# Patient Record
Sex: FEMALE | Race: BLACK OR AFRICAN AMERICAN | NOT HISPANIC OR LATINO
[De-identification: names, ages, dates, MRNs, and addresses within clinical notes are randomized per-mention and may not be internally consistent; named-entity substitution may affect disease eponyms.]

---

## 2020-11-02 ENCOUNTER — APPOINTMENT (OUTPATIENT)
Dept: CARDIOLOGY | Facility: CLINIC | Age: 70
End: 2020-11-02

## 2021-02-13 ENCOUNTER — NON-APPOINTMENT (OUTPATIENT)
Age: 71
End: 2021-02-13

## 2021-02-16 ENCOUNTER — NON-APPOINTMENT (OUTPATIENT)
Age: 71
End: 2021-02-16

## 2021-02-16 ENCOUNTER — APPOINTMENT (OUTPATIENT)
Dept: HEART AND VASCULAR | Facility: CLINIC | Age: 71
End: 2021-02-16
Payer: MEDICARE

## 2021-02-16 VITALS
SYSTOLIC BLOOD PRESSURE: 126 MMHG | HEART RATE: 50 BPM | RESPIRATION RATE: 14 BRPM | HEIGHT: 66 IN | WEIGHT: 230 LBS | DIASTOLIC BLOOD PRESSURE: 71 MMHG | OXYGEN SATURATION: 96 % | BODY MASS INDEX: 36.96 KG/M2 | TEMPERATURE: 98.2 F

## 2021-02-16 PROCEDURE — 99203 OFFICE O/P NEW LOW 30 MIN: CPT

## 2021-02-16 PROCEDURE — 99072 ADDL SUPL MATRL&STAF TM PHE: CPT

## 2021-02-16 NOTE — HISTORY OF PRESENT ILLNESS
[FreeTextEntry1] : PRIMARY CARDIOLOGIST: Dr. Calderon\par \par 71 yo F with PMH HTN, HLD, afib,  asthma, obesity, found to have newly diagnosed CMP with EF 35% here for first evaluation and to obtain a second opinion prior to afib ablation. \par As per records  the patient was admitted to [facility] in the setting of afib with RVR in 9/2020 and  underwent successful  DCCV. Found to have new cardiomyopathy with EF 35% was started on a/c. \par As per patient's report she had a coronary CT as outpatient ( no records) \par The patient reports compliance with her medications, denies any symptoms other than occasional sensation of skipped beats which self resolve. \par Reports excellent exercise tolerance. \par \par PMH\par as above\par \par ALL\par NKDA\par \par MEDS\par xarelto 20 mg daily\par metoprolol 200 mg daily  \par amiodarone 200 mg bid\par losartan 50 mg daily \par HCTZ 25 mg daily \par simvastatin 20 mg daily \par symbycort \par \par SH\par no smoke, no etoh\par \par LABS 9/2020 normal TSH, normal CMP\par \par EKG 2.16.2021\par Sinus bradycardia HR 46

## 2021-02-16 NOTE — ASSESSMENT
[FreeTextEntry1] : 71 yo F with PMH HTN, HLD, afib,  asthma, obesity, found to have newly diagnosed CMP with EF 35% here for first evaluation and to obtain a second opinion prior to afib ablation. \par \par AFIB\par -the patient today is in sinus bradycardia and she denies any symptoms\par -explained that more information from her primary cardiologist would be helpful ( recent echo, recent holter, results of CT coronaries)\par -would also assess symptoms off amiodarone (explained that side effects of long term therapy) \par -would also reduce metoprolol in the setting of bradycardia\par -recc to continue with current meds including a/c at this time, will obtain records from primary cardiologist and review with the patient in 2 weeks\par \par \par HFrEF\par -appears euvolemic today \par -as above\par -obtain records\par -cont with betablocker and ARB\par \par f/u in 2 weeks for record review

## 2021-02-16 NOTE — PHYSICAL EXAM
[Normal Oral Mucosa] : normal oral mucosa [No Oral Pallor] : no oral pallor [No Oral Cyanosis] : no oral cyanosis [Normal Jugular Venous A Waves Present] : normal jugular venous A waves present [Normal Jugular Venous V Waves Present] : normal jugular venous V waves present [No Jugular Venous Zapata A Waves] : no jugular venous zapata A waves [Heart Rate And Rhythm] : heart rate and rhythm were normal [Heart Sounds] : normal S1 and S2 [Murmurs] : no murmurs present [Respiration, Rhythm And Depth] : normal respiratory rhythm and effort [Auscultation Breath Sounds / Voice Sounds] : lungs were clear to auscultation bilaterally [Exaggerated Use Of Accessory Muscles For Inspiration] : no accessory muscle use [Abdomen Soft] : soft [Abdomen Tenderness] : non-tender [Abdomen Mass (___ Cm)] : no abdominal mass palpated [Nail Clubbing] : no clubbing of the fingernails [Cyanosis, Localized] : no localized cyanosis [Petechial Hemorrhages (___cm)] : no petechial hemorrhages [] : no ischemic changes [FreeTextEntry1] : obese, nad

## 2021-03-02 ENCOUNTER — APPOINTMENT (OUTPATIENT)
Dept: HEART AND VASCULAR | Facility: CLINIC | Age: 71
End: 2021-03-02
Payer: MEDICARE

## 2021-03-02 ENCOUNTER — NON-APPOINTMENT (OUTPATIENT)
Age: 71
End: 2021-03-02

## 2021-03-02 VITALS
RESPIRATION RATE: 12 BRPM | WEIGHT: 232 LBS | TEMPERATURE: 97 F | HEIGHT: 66 IN | BODY MASS INDEX: 37.28 KG/M2 | HEART RATE: 55 BPM | SYSTOLIC BLOOD PRESSURE: 137 MMHG | OXYGEN SATURATION: 95 % | DIASTOLIC BLOOD PRESSURE: 73 MMHG

## 2021-03-02 PROCEDURE — 99072 ADDL SUPL MATRL&STAF TM PHE: CPT

## 2021-03-02 PROCEDURE — 99215 OFFICE O/P EST HI 40 MIN: CPT

## 2021-03-02 NOTE — HISTORY OF PRESENT ILLNESS
[FreeTextEntry1] : PRIMARY CARDIOLOGIST: Dr. Calderon\par \par 69 yo F with PMH HTN, HLD, afib,  asthma, obesity, found to have newly diagnosed CMP with EF 35% here for follow up and and to obtain a second opinion prior to afib ablation.\par the patient today did not bring any additional documentation, other than a limited CT chest (results copied below).\par No echo is available, no notes available for review. \par The patient reports feeling fine, reports only occasional palpitations which last only few seconds and self resolve. Reports excellent exercise tolerance. \par Denies syncope, presyncope, orthopnea. \par \par As per previous note on 2/16/2021:  \par As per records  the patient was admitted to [facility] in the setting of afib with RVR in 9/2020 and  underwent successful  DCCV. Found to have new cardiomyopathy with EF 35% was started on a/c. \par As per patient's report she had a coronary CT as outpatient ( no records) \par The patient reports compliance with her medications, denies any symptoms other than occasional sensation of skipped beats which self resolve. \par Reports excellent exercise tolerance. \par \par PMH\par as above\par \par ALL\par NKDA\par \par MEDS\par xarelto 20 mg daily\par metoprolol 200 mg daily  \par amiodarone 200 mg bid\par losartan 50 mg daily \par HCTZ 25 mg daily \par simvastatin 20 mg daily \par symbycort \par \par SH\par no smoke, no etoh\par \par LABS 9/2020 normal TSH, normal CMP\par \par EKG 2.16.2021\par Sinus bradycardia HR 46 \par \par EKG 03/02/2021 SB HR 42\par \par CT heart 2/16/2021\par limited coronary artery assessment on this non dedicated CT sutdy. extensive coronary artery calcified atherosclerotic plaque. no definite significant stenosis involving the LAD,although assessment is limited. \par \par LAbs 1/19/21\par creatininie 0.8

## 2021-03-02 NOTE — PHYSICAL EXAM
[Normal Oral Mucosa] : normal oral mucosa [No Oral Pallor] : no oral pallor [No Oral Cyanosis] : no oral cyanosis [Normal Jugular Venous A Waves Present] : normal jugular venous A waves present [Normal Jugular Venous V Waves Present] : normal jugular venous V waves present [No Jugular Venous Zapata A Waves] : no jugular venous zapata A waves [Heart Rate And Rhythm] : heart rate and rhythm were normal [Heart Sounds] : normal S1 and S2 [Murmurs] : no murmurs present [Respiration, Rhythm And Depth] : normal respiratory rhythm and effort [Exaggerated Use Of Accessory Muscles For Inspiration] : no accessory muscle use [Auscultation Breath Sounds / Voice Sounds] : lungs were clear to auscultation bilaterally [Abdomen Soft] : soft [Abdomen Tenderness] : non-tender [Abdomen Mass (___ Cm)] : no abdominal mass palpated [Nail Clubbing] : no clubbing of the fingernails [Cyanosis, Localized] : no localized cyanosis [Petechial Hemorrhages (___cm)] : no petechial hemorrhages [] : no ischemic changes [FreeTextEntry1] : obese, nad

## 2021-03-02 NOTE — ASSESSMENT
[FreeTextEntry1] : 71 yo F with PMH HTN, HLD, afib,  asthma, obesity, found to have newly diagnosed CMP with EF 35% here  to obtain a second opinion prior to afib ablation. \par \par AFIB\par -the patient today is in sinus bradycardia and she denies any symptoms\par -She was in SB also on last visit\par -explained that more information ( recent echo, recent holter, results of CT coronaries) is necessary\par -the patient reports that last echo and holter were done in 9/2020 \par -in the setting of marked bradycardia, recc to decrease metoprolol from 200 mg daily to 100 mg daily \par -will obtain echo and holter to assess EF after > 5 months on medical therapy and holter to assess afib burden\par -explained that long term therapy with amiodarone is not recommended and after holter will consider d/cing amiodarone \par -the patient was explained that these tests are necessary in order to provide her with a second opininon in regards to the need for an ablation. Also explained that referral to EP specialist is reccomended\par \par \par HFrEF\par -appears euvolemic today \par -etiology of cardiomyoapthy is unclear (tachycardia induced? vs othr? )\par -obtain records, would recc dedicated CT coronaries to r/o CAD\par -obtain echo and holter \par -cont with betablocker and ARB\par \par f/u in 3 weeks for echo and holter results and to assess symptoms on lower BB dose

## 2021-03-09 ENCOUNTER — NON-APPOINTMENT (OUTPATIENT)
Age: 71
End: 2021-03-09

## 2021-03-13 ENCOUNTER — NON-APPOINTMENT (OUTPATIENT)
Age: 71
End: 2021-03-13

## 2021-03-16 ENCOUNTER — APPOINTMENT (OUTPATIENT)
Dept: HEART AND VASCULAR | Facility: CLINIC | Age: 71
End: 2021-03-16
Payer: MEDICARE

## 2021-03-16 PROCEDURE — 99072 ADDL SUPL MATRL&STAF TM PHE: CPT

## 2021-03-16 PROCEDURE — 93306 TTE W/DOPPLER COMPLETE: CPT

## 2021-04-08 ENCOUNTER — APPOINTMENT (OUTPATIENT)
Dept: HEART AND VASCULAR | Facility: CLINIC | Age: 71
End: 2021-04-08
Payer: MEDICARE

## 2021-04-08 VITALS
TEMPERATURE: 97.6 F | SYSTOLIC BLOOD PRESSURE: 111 MMHG | RESPIRATION RATE: 15 BRPM | OXYGEN SATURATION: 96 % | BODY MASS INDEX: 41.99 KG/M2 | HEART RATE: 52 BPM | HEIGHT: 63 IN | WEIGHT: 237 LBS | DIASTOLIC BLOOD PRESSURE: 69 MMHG

## 2021-04-08 PROCEDURE — 99215 OFFICE O/P EST HI 40 MIN: CPT

## 2021-04-08 PROCEDURE — 99072 ADDL SUPL MATRL&STAF TM PHE: CPT

## 2021-04-08 NOTE — ASSESSMENT
[FreeTextEntry1] : 69 yo F with PMH HTN, HLD, afib,  asthma, obesity, HFrEF now recovered here for follow up  \par \par AFIB\par -the patient today is in sinus bradycardia and she denies any symptoms\par -Holter monitor without evidence of atrial fibrillation, only sinus bradycardia\par -Echocardiogram with recovered EF\par -in the setting of marked bradycardia, recc to decrease metoprolol from 200 mg daily to 100 mg daily \par -will also stop amiodarone recommended to the patient to call the clinic immediately for new onset of palpitations\par -CMP, TSH today\par \par HFrEF\par NYHA class I\par -EF recovered to normal  echocardiogram 3/16/21\par -appears euvolemic today \par -etiology of cardiomyopathy is likely tachycardia induced but  would recc dedicated CT coronaries to r/o CAD in the setting of extensive calcification \par -obtain records from [facility] to assess which tests were done to assess coronary anatomy \par -cont with beta-blocker and ARB\par \par HLD\par -fasting lipid panel\par \par \par \par f/u in 3 weeks for symptoms assessment off amiodarone and on lower BB dose and f/u labs /review documentation

## 2021-04-08 NOTE — HISTORY OF PRESENT ILLNESS
[FreeTextEntry1] : PRIMARY CARDIOLOGIST: Dr. Calderon\par \par 71 yo F with PMH HTN, HLD, afib,  asthma, obesity, found to have newly diagnosed CMP with EF 35% in 9/2020 here for followup and obtain echo and holter results\par The patient reports feeling fine. Reports excellent exercise tolerance. \par Denies syncope, presyncope, orthopnea. \par The patient reports that did not decrease her metoprolol dose from 200 mg daily to 100 mg daily  as previously prescribed\par \par PMH\par as above\par \par ALL\par NKDA\par \par MEDS\par xarelto 20 mg daily\par metoprolol 200 mg daily --> switched to 100 mg daily  \par amiodarone 200 mg bid--> stopped on 4.8.2021\par losartan 50 mg daily \par HCTZ 25 mg daily \par simvastatin 20 mg daily \par symbycort \par \par SH\par no smoke, no etoh\par \par LABS 9/2020 normal TSH, normal CMP\par \par EKG 2.16.2021\par Sinus bradycardia HR 46 \par \par EKG 03/02/2021 SB HR 42\par \par CT heart 2/16/2021\par limited coronary artery assessment on this non dedicated CT sutdy. extensive coronary artery calcified atherosclerotic plaque. no definite significant stenosis involving the LAD,although assessment is limited. \par \par LAbs 1/19/21\par creatininie 0.8\par \par Echocardiogram 3/22/2021\par -Left ventricular ejection fraction is 60-65%\par -Mild TR\par -PAPS= 43 mmHg\par \par Holter monitor 3/30/20/2021\par Sinus bradycardia heart rates ranging 40-47 bpm\par No atrial fibrillation\par \par

## 2021-05-13 ENCOUNTER — RX RENEWAL (OUTPATIENT)
Age: 71
End: 2021-05-13

## 2021-05-13 ENCOUNTER — APPOINTMENT (OUTPATIENT)
Dept: HEART AND VASCULAR | Facility: CLINIC | Age: 71
End: 2021-05-13
Payer: MEDICARE

## 2021-05-13 VITALS
RESPIRATION RATE: 16 BRPM | HEART RATE: 49 BPM | WEIGHT: 242 LBS | OXYGEN SATURATION: 98 % | TEMPERATURE: 96.4 F | BODY MASS INDEX: 42.88 KG/M2 | DIASTOLIC BLOOD PRESSURE: 77 MMHG | HEIGHT: 63 IN | SYSTOLIC BLOOD PRESSURE: 150 MMHG

## 2021-05-13 VITALS — DIASTOLIC BLOOD PRESSURE: 80 MMHG | SYSTOLIC BLOOD PRESSURE: 140 MMHG | HEART RATE: 52 BPM

## 2021-05-13 PROCEDURE — 99072 ADDL SUPL MATRL&STAF TM PHE: CPT

## 2021-05-13 PROCEDURE — 99215 OFFICE O/P EST HI 40 MIN: CPT

## 2021-05-13 NOTE — ASSESSMENT
[FreeTextEntry1] : 70 yo F with PMH HTN, HLD, afib,  asthma, obesity, found to have newly diagnosed CMP with EF 35% in 9/2020 with now recovered EF here for followup \par \par AFIB\par -the patient today is in sinus bradycardia and she denies any symptoms\par -Holter monitor without evidence of atrial fibrillation, only sinus bradycardia\par -Echocardiogram with recovered EF\par -cont  metoprolol 100 mg daily (was reduced on last visit, recc to reports any symptoms), now off amiodarone\par -CMP, TSH 4/8/2021 wnl \par \par CHEST PAIN\par -atypical, but in the setting of risk factors, h/o CMP, extensive coronary calcification on non dedecated CT heart, will define coronary anatomy with dedicated CT angio to r/o CAD\par \par HFrEF\par NYHA class I\par -EF recovered to normal  echocardiogram 3/16/21\par -appears euvolemic today \par -etiology of cardiomyopathy is likely tachycardia induced\par -cont with beta-blocker and ARB for at least one year\par \par HLD\par -fasting lipid panel 4/8/2021 trig 67, chol 191, hdl 70, ldl 107\par -cont with atorvastatin recc healthy diet and exercise\par \par HTN\par -reports good BP control at home, BP borderline binu on second BP check today\par -recc to cont with BB and ARB at current doses, and monitor BP at home\par -if BP still borderline at next visit, will uptitrate losartan\par \par f/u in 3 weeks for symptoms assessment and CTA coronaries results

## 2021-05-13 NOTE — HISTORY OF PRESENT ILLNESS
[FreeTextEntry1] : 70 yo F with PMH HTN, HLD, afib,  asthma, obesity, found to have newly diagnosed CMP with EF 35% in 9/2020 with now recovered EF here for followup \par The patient reports feeling fine. Reports good exercise tolerance but reports episodes of chest discomfort both at rest and during exercise, lasting few seconds, self resolving. Denies palpitations after d/cing amiodarone\par Denies syncope, presyncope, orthopnea. \par \par \par \par PMH\par as above\par \par ALL\par NKDA\par \par MEDS\par xarelto 20 mg daily\par metoprolol 100 mg daily  \par losartan 50 mg daily \par HCTZ 25 mg daily \par simvastatin 20 mg daily \par symbycort \par \par SH\par no smoke, no etoh\par \par LABS 9/2020 normal TSH, normal CMP\par LAbs 1/19/21\par creatininie 0.8\par \par 4/82021\par creat 0.75\par \par EKG 2.16.2021\par Sinus bradycardia HR 46 \par \par EKG 03/02/2021 SB HR 42\par \par CT heart 2/16/2021\par limited coronary artery assessment on this non dedicated CT sutdy. extensive coronary artery calcified atherosclerotic plaque. no definite significant stenosis involving the LAD,although assessment is limited. \par \par Echocardiogram 3/22/2021\par -Left ventricular ejection fraction is 60-65%\par -Mild TR\par -PAPS= 43 mmHg\par \par Holter monitor 3/30/20/2021\par Sinus bradycardia heart rates ranging 40-47 bpm\par No atrial fibrillation\par \par

## 2021-05-13 NOTE — PHYSICAL EXAM
[Normal Oral Mucosa] : normal oral mucosa [No Oral Pallor] : no oral pallor [No Oral Cyanosis] : no oral cyanosis [Normal Jugular Venous A Waves Present] : normal jugular venous A waves present [Normal Jugular Venous V Waves Present] : normal jugular venous V waves present [No Jugular Venous Zapata A Waves] : no jugular venous zapata A waves [Heart Rate And Rhythm] : heart rate and rhythm were normal [Heart Sounds] : normal S1 and S2 [Murmurs] : no murmurs present [Respiration, Rhythm And Depth] : normal respiratory rhythm and effort [Exaggerated Use Of Accessory Muscles For Inspiration] : no accessory muscle use [Auscultation Breath Sounds / Voice Sounds] : lungs were clear to auscultation bilaterally [Abdomen Soft] : soft [Abdomen Tenderness] : non-tender [Abdomen Mass (___ Cm)] : no abdominal mass palpated [Nail Clubbing] : no clubbing of the fingernails [Petechial Hemorrhages (___cm)] : no petechial hemorrhages [Cyanosis, Localized] : no localized cyanosis [] : no ischemic changes [FreeTextEntry1] : obese, nad

## 2021-06-14 ENCOUNTER — APPOINTMENT (OUTPATIENT)
Dept: CT IMAGING | Facility: HOSPITAL | Age: 71
End: 2021-06-14

## 2021-06-21 ENCOUNTER — APPOINTMENT (OUTPATIENT)
Dept: CT IMAGING | Facility: CLINIC | Age: 71
End: 2021-06-21
Payer: MEDICARE

## 2021-06-21 ENCOUNTER — OUTPATIENT (OUTPATIENT)
Dept: OUTPATIENT SERVICES | Facility: HOSPITAL | Age: 71
LOS: 1 days | End: 2021-06-21
Payer: COMMERCIAL

## 2021-06-21 DIAGNOSIS — R06.00 DYSPNEA, UNSPECIFIED: ICD-10-CM

## 2021-06-21 DIAGNOSIS — R07.89 OTHER CHEST PAIN: ICD-10-CM

## 2021-06-21 PROCEDURE — 82565 ASSAY OF CREATININE: CPT

## 2021-06-21 PROCEDURE — 75574 CT ANGIO HRT W/3D IMAGE: CPT

## 2021-06-21 PROCEDURE — 75574 CT ANGIO HRT W/3D IMAGE: CPT | Mod: 26

## 2021-07-08 ENCOUNTER — APPOINTMENT (OUTPATIENT)
Dept: HEART AND VASCULAR | Facility: CLINIC | Age: 71
End: 2021-07-08
Payer: MEDICARE

## 2021-07-08 VITALS
WEIGHT: 246 LBS | TEMPERATURE: 97.9 F | OXYGEN SATURATION: 95 % | DIASTOLIC BLOOD PRESSURE: 71 MMHG | HEIGHT: 63 IN | SYSTOLIC BLOOD PRESSURE: 128 MMHG | HEART RATE: 62 BPM | BODY MASS INDEX: 43.59 KG/M2 | RESPIRATION RATE: 15 BRPM

## 2021-07-08 PROCEDURE — 99215 OFFICE O/P EST HI 40 MIN: CPT

## 2021-07-08 NOTE — HISTORY OF PRESENT ILLNESS
[FreeTextEntry1] : 70 yo F with PMH HTN, HLD, afib,  asthma, obesity, found to have newly diagnosed CMP with EF 35% in 9/2020 with now recovered EF here for followup obtain coronary CTA results\par The patient reports feeling fine. Reports good exercise tolerance. Denies any  palpitations after d/cing amiodarone\par Denies syncope, presyncope, orthopnea. Reports resolution of her chest pain since last visit \par \par \par \par PMH\par as above\par \par ALL\par NKDA\par \par MEDS\par xarelto 20 mg daily\par metoprolol 100 mg daily  \par losartan 50 mg daily \par HCTZ 25 mg daily \par simvastatin 20 mg daily \par symbycort \par \par SH\par no smoke, no etoh\par \par LABS 9/2020 normal TSH, normal CMP\par LAbs 1/19/21\par creatininie 0.8\par \par 4/82021\par creat 0.75\par \par EKG 2.16.2021\par Sinus bradycardia HR 46 \par \par EKG 03/02/2021 SB HR 42\par \par CT heart 2/16/2021\par limited coronary artery assessment on this non dedicated CT sutdy. extensive coronary artery calcified atherosclerotic plaque. no definite significant stenosis involving the LAD,although assessment is limited. \par \par Echocardiogram 3/22/2021\par -Left ventricular ejection fraction is 60-65%\par -Mild TR\par -PAPS= 43 mmHg\par \par Holter monitor 3/30/20/2021\par Sinus bradycardia heart rates ranging 40-47 bpm\par No atrial fibrillation\par \par CTA coronaries 6/21/2021\par Nonobstructive epicardial coronary artery disease (LAD 30-49%, LCx 30%, RCA 30%)\par ,\par \par

## 2021-07-08 NOTE — ASSESSMENT
[FreeTextEntry1] : 70 yo F with PMH HTN, HLD, afib,  asthma, obesity, found to have newly diagnosed CMP with EF 35% in 9/2020 with now recovered EF here for followup \par \par AFIB\par -Recent Holter monitor without evidence of atrial fibrillation, only sinus bradycardia\par -Echocardiogram with recovered EF\par -cont  metoprolol 100 mg daily, now off amiodarone\par -CMP, TSH 4/8/2021 wnl \par -Commended to call the clinic immediately if new onset of palpitations\par -Continue with anticoagulation\par \par CHEST PAIN\par -Resolved\par -Nonobstructive coronary artery disease on CTA\par -recommend to continue medical therapy with aspirin, statin and BB\par \par HFrEF\par NYHA class I\par -EF recovered to normal  echocardiogram 3/16/21\par -appears euvolemic today \par -etiology of cardiomyopathy is likely tachycardia induced\par -cont with beta-blocker and ARB for at least one year\par \par HLD\par -fasting lipid panel 4/8/2021 trig 67, chol 191, hdl 70, ldl 107\par -cont with atorvastatin recc healthy diet and exercise\par \par HTN\par -Well-controlled today\par -recc to cont with BB and ARB at current doses, and monitor BP at home\par \par \par f/u in 4 months or sooner if any symptoms

## 2021-07-08 NOTE — PHYSICAL EXAM
[FreeTextEntry1] : obese, nad  [Normal Oral Mucosa] : normal oral mucosa [No Oral Pallor] : no oral pallor [No Oral Cyanosis] : no oral cyanosis [Normal Jugular Venous A Waves Present] : normal jugular venous A waves present [Normal Jugular Venous V Waves Present] : normal jugular venous V waves present [No Jugular Venous Zapata A Waves] : no jugular venous zapata A waves [Heart Rate And Rhythm] : heart rate and rhythm were normal [Heart Sounds] : normal S1 and S2 [Murmurs] : no murmurs present [Respiration, Rhythm And Depth] : normal respiratory rhythm and effort [Exaggerated Use Of Accessory Muscles For Inspiration] : no accessory muscle use [Auscultation Breath Sounds / Voice Sounds] : lungs were clear to auscultation bilaterally [Abdomen Soft] : soft [Abdomen Tenderness] : non-tender [Abdomen Mass (___ Cm)] : no abdominal mass palpated [Nail Clubbing] : no clubbing of the fingernails [Cyanosis, Localized] : no localized cyanosis [Petechial Hemorrhages (___cm)] : no petechial hemorrhages [] : no ischemic changes

## 2021-11-04 ENCOUNTER — NON-APPOINTMENT (OUTPATIENT)
Age: 71
End: 2021-11-04

## 2021-11-04 ENCOUNTER — APPOINTMENT (OUTPATIENT)
Dept: HEART AND VASCULAR | Facility: CLINIC | Age: 71
End: 2021-11-04
Payer: MEDICARE

## 2021-11-04 VITALS
OXYGEN SATURATION: 98 % | HEART RATE: 76 BPM | RESPIRATION RATE: 15 BRPM | SYSTOLIC BLOOD PRESSURE: 110 MMHG | HEIGHT: 63 IN | TEMPERATURE: 98.3 F | WEIGHT: 239 LBS | DIASTOLIC BLOOD PRESSURE: 64 MMHG | BODY MASS INDEX: 42.35 KG/M2

## 2021-11-04 PROCEDURE — 99214 OFFICE O/P EST MOD 30 MIN: CPT

## 2021-11-04 NOTE — HISTORY OF PRESENT ILLNESS
[FreeTextEntry1] : 70 yo F with PMH HTN, HLD, afib,  asthma, obesity, found to have newly diagnosed CMP with EF 35% in 9/2020 with now recovered EF here for followup \par The patient reports feeling fine. Reports good exercise tolerance\par The patient denies any chest pain, shortness of breath, palpitations, syncope, presyncope.\par She reports compliance with her medication without any side effects\par \par \par PMH\par as above\par \par ALL\par NKDA\par \par MEDS\par xarelto 20 mg daily\par metoprolol 100 mg daily  \par losartan 50 mg daily \par HCTZ 25 mg daily \par simvastatin 20 mg daily \par symbycort \par \par SH\par no smoke, no etoh\par \par LABS 9/2020 normal TSH, normal CMP\par LAbs 1/19/21\par creatininie 0.8\par \par 4/82021\par creat 0.75\par \par EKG 2.16.2021\par Sinus bradycardia HR 46 \par \par EKG 03/02/2021 SB HR 42\par EKG 11/4/21: A. fib,HR 72\par \par CT heart 2/16/2021\par limited coronary artery assessment on this non dedicated CT sutdy. extensive coronary artery calcified atherosclerotic plaque. no definite significant stenosis involving the LAD,although assessment is limited. \par \par Echocardiogram 3/22/2021\par -Left ventricular ejection fraction is 60-65%\par -Mild TR\par -PAPS= 43 mmHg\par \par Holter monitor 3/30/20/2021\par Sinus bradycardia heart rates ranging 40-47 bpm\par No atrial fibrillation\par \par CTA coronaries 6/21/2021\par Nonobstructive epicardial coronary artery disease (LAD 30-49%, LCx 30%, RCA 30%)\par ,\par \par

## 2021-11-04 NOTE — ASSESSMENT
[FreeTextEntry1] : 70 yo F with PMH HTN, HLD, afib,  asthma, obesity, found to have newly diagnosed CMP with EF 35% in 9/2020 with now recovered EF here for followup \par \par AFIB\par -Rate controlled today\par -Echocardiogram with recovered EF on 3/16/2021\par -cont  metoprolol 100 mg daily, now off amiodarone\par -CMP, TSH 4/8/2021 wnl \par -Continue with anticoagulation\par \par CHEST PAIN\par -Resolved\par -Nonobstructive coronary artery disease on CTA on 6/21/2021\par -recommend to continue medical therapy with aspirin, statin and BB\par -Recommend aggressive LDL control\par \par HFrEF\par NYHA class I\par -EF recovered to normal  echocardiogram 3/16/21\par -appears euvolemic today \par -etiology of cardiomyopathy is likely tachycardia induced\par -cont with beta-blocker and ARB for at least one year\par \par HLD\par -fasting lipid panel 4/8/2021 trig 67, chol 191, hdl 70, ldl 107\par -cont with simvastatin recc healthy diet and exercise.  LDL goal less than 100, consider switching to atorvastatin 40 mg daily\par \par HTN\par -Well-controlled today\par -recc to cont with BB  ARB and HCTZ at current doses, and monitor BP at home\par \par \par f/u in 4 months or sooner if any symptoms

## 2022-01-06 ENCOUNTER — APPOINTMENT (OUTPATIENT)
Dept: HEART AND VASCULAR | Facility: CLINIC | Age: 72
End: 2022-01-06
Payer: MEDICARE

## 2022-01-06 ENCOUNTER — NON-APPOINTMENT (OUTPATIENT)
Age: 72
End: 2022-01-06

## 2022-01-06 VITALS
OXYGEN SATURATION: 96 % | RESPIRATION RATE: 12 BRPM | HEART RATE: 104 BPM | HEIGHT: 63 IN | DIASTOLIC BLOOD PRESSURE: 68 MMHG | SYSTOLIC BLOOD PRESSURE: 112 MMHG | TEMPERATURE: 96.9 F | BODY MASS INDEX: 41.82 KG/M2 | WEIGHT: 236 LBS

## 2022-01-06 PROCEDURE — 99214 OFFICE O/P EST MOD 30 MIN: CPT

## 2022-01-06 NOTE — ASSESSMENT
[FreeTextEntry1] : 72 yo F with PMH HTN, HLD, afib,  asthma, obesity, found to have newly diagnosed CMP with EF 35% in 9/2020 with now recovered EF here for followup \par \par AFIB\par -Rate controlled today\par -Echocardiogram with recovered EF on 3/16/2021\par -cont  metoprolol 100 mg daily, now off amiodarone\par -obtain recent CMP from [facility]\par -Continue with anticoagulation in setting of high CHADsVAsc scroe\par \par CHEST PAIN\par -Resolved\par -Nonobstructive coronary artery disease on CTA on 6/21/2021\par -recommend to continue medical therapy with aspirin, statin and BB\par -Recommend aggressive LDL control\par \par HFrEF\par NYHA class I\par -EF recovered to normal  echocardiogram 3/16/21\par -appears euvolemic today \par -etiology of cardiomyopathy is likely tachycardia induced\par -cont with beta-blocker and ARB for at least one year\par \par HLD\par -fasting lipid panel 4/8/2021  (will obtain recent fasting lipids from [facility])\par -If LDL less than 100,will  be switching to atorvastatin 40 mg daily\par \par HTN\par -Well-controlled today\par -recc to cont with BB  ARB and HCTZ at current doses, and monitor BP at home\par \par \par f/u in 4 months or sooner if any symptoms

## 2022-01-06 NOTE — HISTORY OF PRESENT ILLNESS
[FreeTextEntry1] : 72 yo F with PMH HTN, HLD, afib,  asthma, obesity, (HFrEF (EF 35% in 9/2020 with now recovered EF)  here for followup \par The patient reports feeling fine. Reports good ET, can walk 10 blocks without stopping\par The patient denies any CP/shortness of breath/palpitations/ syncope/ presyncope/LE edema\par She reports compliance with her medication without any side effects\par Reports she had cholesterol checked at [facility] (Dr Andujar) last month\par \par PMH\par as above\par \par ALL\par NKDA\par \par MEDS\par xarelto 20 mg daily\par metoprolol 100 mg daily  \par losartan 50 mg daily \par HCTZ 25 mg daily \par simvastatin 20 mg daily \par symbycort \par \par SH\par no smoke, no etoh\par \par LABS 9/2020 normal TSH, normal CMP\par LAbs 1/19/21\par creatininie 0.8\par \par 4/82021\par creat 0.75\par \par EKG 2.16.2021\par Sinus bradycardia HR 46 \par \par EKG 03/02/2021 SB HR 42\par EKG 11/4/21: A. fib,HR 72\par EKG 1/6/2022: Afib HR 75\par \par \par CT heart 2/16/2021\par limited coronary artery assessment on this non dedicated CT study. extensive coronary artery calcified atherosclerotic plaque. no definite significant stenosis involving the LAD,although assessment is limited. \par \par Echocardiogram 3/22/2021\par -Left ventricular ejection fraction is 60-65%\par -Mild TR\par -PAPS= 43 mmHg\par \par Holter monitor 3/30/20/2021\par Sinus bradycardia heart rates ranging 40-47 bpm\par No atrial fibrillation\par \par CTA coronaries 6/21/2021\par Nonobstructive epicardial coronary artery disease (LAD 30-49%, LCx 30%, RCA 30%)\par ,\par \par

## 2022-01-06 NOTE — PHYSICAL EXAM
[Normal Oral Mucosa] : normal oral mucosa [No Oral Pallor] : no oral pallor [No Oral Cyanosis] : no oral cyanosis [Normal Jugular Venous A Waves Present] : normal jugular venous A waves present [Normal Jugular Venous V Waves Present] : normal jugular venous V waves present [No Jugular Venous Zapata A Waves] : no jugular venous zapata A waves [Heart Rate And Rhythm] : heart rate and rhythm were normal [Heart Sounds] : normal S1 and S2 [Murmurs] : no murmurs present [Respiration, Rhythm And Depth] : normal respiratory rhythm and effort [Exaggerated Use Of Accessory Muscles For Inspiration] : no accessory muscle use [Auscultation Breath Sounds / Voice Sounds] : lungs were clear to auscultation bilaterally [Abdomen Soft] : soft [Abdomen Tenderness] : non-tender [Abdomen Mass (___ Cm)] : no abdominal mass palpated [Nail Clubbing] : no clubbing of the fingernails [Cyanosis, Localized] : no localized cyanosis [Petechial Hemorrhages (___cm)] : no petechial hemorrhages [] : no ischemic changes [Well Developed] : well developed [Well Nourished] : well nourished [No Acute Distress] : no acute distress [Normal Conjunctiva] : normal conjunctiva [Normal Venous Pressure] : normal venous pressure [No Carotid Bruit] : no carotid bruit [Normal S1, S2] : normal S1, S2 [No Murmur] : no murmur [No Rub] : no rub [No Gallop] : no gallop [Clear Lung Fields] : clear lung fields [Good Air Entry] : good air entry [No Respiratory Distress] : no respiratory distress  [Soft] : abdomen soft [Non Tender] : non-tender [No Masses/organomegaly] : no masses/organomegaly [Normal Bowel Sounds] : normal bowel sounds [Normal Gait] : normal gait [No Edema] : no edema [No Cyanosis] : no cyanosis [No Clubbing] : no clubbing [No Varicosities] : no varicosities [No Rash] : no rash [No Skin Lesions] : no skin lesions [Moves all extremities] : moves all extremities [No Focal Deficits] : no focal deficits [Normal Speech] : normal speech [Alert and Oriented] : alert and oriented [Normal memory] : normal memory [FreeTextEntry1] : obese, nad

## 2022-04-08 LAB
ALBUMIN SERPL ELPH-MCNC: 4.3 G/DL
ALP BLD-CCNC: 62 U/L
ALT SERPL-CCNC: 20 U/L
ANION GAP SERPL CALC-SCNC: 12 MMOL/L
AST SERPL-CCNC: 19 U/L
BILIRUB SERPL-MCNC: 0.3 MG/DL
BUN SERPL-MCNC: 19 MG/DL
CALCIUM SERPL-MCNC: 9.7 MG/DL
CHLORIDE SERPL-SCNC: 103 MMOL/L
CHOLEST SERPL-MCNC: 191 MG/DL
CO2 SERPL-SCNC: 28 MMOL/L
CREAT SERPL-MCNC: 0.75 MG/DL
GLUCOSE SERPL-MCNC: 92 MG/DL
HDLC SERPL-MCNC: 70 MG/DL
LDLC SERPL CALC-MCNC: 107 MG/DL
NONHDLC SERPL-MCNC: 121 MG/DL
POTASSIUM SERPL-SCNC: 4 MMOL/L
PROT SERPL-MCNC: 6.2 G/DL
SODIUM SERPL-SCNC: 143 MMOL/L
TRIGL SERPL-MCNC: 67 MG/DL
TSH SERPL-ACNC: 1.11 UIU/ML

## 2022-05-10 ENCOUNTER — APPOINTMENT (OUTPATIENT)
Dept: HEART AND VASCULAR | Facility: CLINIC | Age: 72
End: 2022-05-10

## 2022-05-16 ENCOUNTER — APPOINTMENT (OUTPATIENT)
Dept: HEART AND VASCULAR | Facility: CLINIC | Age: 72
End: 2022-05-16
Payer: MEDICARE

## 2022-05-16 ENCOUNTER — NON-APPOINTMENT (OUTPATIENT)
Age: 72
End: 2022-05-16

## 2022-05-16 VITALS
BODY MASS INDEX: 41.82 KG/M2 | SYSTOLIC BLOOD PRESSURE: 112 MMHG | DIASTOLIC BLOOD PRESSURE: 66 MMHG | HEIGHT: 63 IN | RESPIRATION RATE: 14 BRPM | OXYGEN SATURATION: 99 % | WEIGHT: 236 LBS | HEART RATE: 99 BPM

## 2022-05-16 PROCEDURE — 99214 OFFICE O/P EST MOD 30 MIN: CPT

## 2022-05-16 NOTE — ASSESSMENT
[FreeTextEntry1] : 73 yo F with PMH HTN, HLD, afib,  asthma, obesity, found to have newly diagnosed CMP with EF 35% in 9/2020 with now recovered EF here for followup \par \par Dizziness\par -Only occurred at rest. Last episode last month, not associated with syncope, presyncope or palpitations\par -EKG today Afib, rate controlled\par -will obtain a b/l carotid duplex \par -if normal carotids and persistent symptoms, will consider  Holter to assess A. fib burden and exclude A. fib RVR\par \par AFIB\par -Rate controlled today\par -Echocardiogram with recovered EF on 3/16/2021, repeat echocardiogram today to confirm LVEF after 1 year\par -cont  metoprolol 100 mg daily, now off amiodarone\par -Cr 0.75 3/2021. REpeat CMP\par -Continue with anticoagulation in setting of high CHADsVAsc score\par \par \par HFrEF\par NYHA class I\par -EF recovered to normal  echocardiogram 3/16/21. Repeat echo today \par -appears euvolemic today \par -etiology of cardiomyopathy is likely tachycardia induced\par -Nonobstructive coronary artery disease on CTA on 6/21/2021\par -cont with beta-blocker and ARB for at least one year\par \par HLD\par - on 3/8/2021\par -Will stop simvastatin and start atorvastatin 40 mg daily as LDL not at goal\par -repeat lipids\par \par HTN\par -Well-controlled today and at home\par -recc to cont with BB  ARB and HCTZ at current doses, and monitor BP at home\par \par \par f/u in 4 months or sooner if any symptoms\par -Follow-up over the phone in 2 weeks for echo results and symptom assessment

## 2022-05-16 NOTE — PHYSICAL EXAM
[Well Developed] : well developed [Well Nourished] : well nourished [No Acute Distress] : no acute distress [Normal Conjunctiva] : normal conjunctiva [Normal Venous Pressure] : normal venous pressure [No Carotid Bruit] : no carotid bruit [Normal S1, S2] : normal S1, S2 [No Murmur] : no murmur [No Rub] : no rub [No Gallop] : no gallop [Clear Lung Fields] : clear lung fields [Good Air Entry] : good air entry [No Respiratory Distress] : no respiratory distress  [Soft] : abdomen soft [Non Tender] : non-tender [No Masses/organomegaly] : no masses/organomegaly [Normal Bowel Sounds] : normal bowel sounds [Normal Gait] : normal gait [No Edema] : no edema [No Cyanosis] : no cyanosis [No Clubbing] : no clubbing [No Varicosities] : no varicosities [No Rash] : no rash [No Skin Lesions] : no skin lesions [Moves all extremities] : moves all extremities [No Focal Deficits] : no focal deficits [Normal Speech] : normal speech [Alert and Oriented] : alert and oriented [Normal memory] : normal memory [Normal Oral Mucosa] : normal oral mucosa [No Oral Pallor] : no oral pallor [No Oral Cyanosis] : no oral cyanosis [Normal Jugular Venous A Waves Present] : normal jugular venous A waves present [Normal Jugular Venous V Waves Present] : normal jugular venous V waves present [No Jugular Venous Zapata A Waves] : no jugular venous zapata A waves [Heart Rate And Rhythm] : heart rate and rhythm were normal [Heart Sounds] : normal S1 and S2 [Murmurs] : no murmurs present [Respiration, Rhythm And Depth] : normal respiratory rhythm and effort [Exaggerated Use Of Accessory Muscles For Inspiration] : no accessory muscle use [Auscultation Breath Sounds / Voice Sounds] : lungs were clear to auscultation bilaterally [Abdomen Soft] : soft [Abdomen Tenderness] : non-tender [Abdomen Mass (___ Cm)] : no abdominal mass palpated [Nail Clubbing] : no clubbing of the fingernails [Cyanosis, Localized] : no localized cyanosis [Petechial Hemorrhages (___cm)] : no petechial hemorrhages [] : no ischemic changes [FreeTextEntry1] : obese, nad

## 2022-05-16 NOTE — HISTORY OF PRESENT ILLNESS
[FreeTextEntry1] : 73 yo F with PMH HTN, HLD, afib,  asthma, obesity, (HFrEF (EF 35% in 9/2020 with now recovered EF)  here for followup \par \par Patient reports three episodes of dizziness while sitting one month ago; lasts 30 seconds and self resolves. Only occurred while sitting. No new episodes since. \par No CP/palp/SOB/presyncope/LE edema\par Home BP well controlled 120's/ 60-70's\par Compliant with meds\par Can walk 10 blocks\par \par \par PMH\par as above\par \par ALL\par NKDA\par \par MEDS\par xarelto 20 mg daily\par metoprolol 100 mg daily  \par losartan 50 mg daily \par HCTZ 25 mg daily \par simvastatin 20 mg daily \par Symbicort \par \par SH\par no smoke, no etoh\par \par \par \par LABS 9/2020 normal TSH, normal CMP\par LAbs 1/19/21\par creatinine 0.8\par \par 4/82021 \par creat 0.75\par \par EKG 2.16.2021\par Sinus bradycardia HR 46 \par \par EKG 5/16/2022 Afib, non specific T abnormality\par EKG 03/02/2021 SB HR 42\par EKG 11/4/21: A. fib,HR 72\par EKG 1/6/2022: Afib HR 75\par \par \par CT heart 2/16/2021\par limited coronary artery assessment on this non dedicated CT study. extensive coronary artery calcified atherosclerotic plaque. no definite significant stenosis involving the LAD,although assessment is limited. \par \par Echocardiogram 3/22/2021\par -Left ventricular ejection fraction is 60-65%\par -Mild TR\par -PAPS= 43 mmHg\par \par Holter monitor 3/30/20/2021\par Sinus bradycardia heart rates ranging 40-47 bpm\par No atrial fibrillation\par \par CTA coronaries 6/21/2021\par Nonobstructive epicardial coronary artery disease (LAD 30-49%, LCx 30%, RCA 30%)\par \par \par

## 2022-05-23 ENCOUNTER — APPOINTMENT (OUTPATIENT)
Dept: HEART AND VASCULAR | Facility: CLINIC | Age: 72
End: 2022-05-23

## 2022-06-01 ENCOUNTER — APPOINTMENT (OUTPATIENT)
Dept: HEART AND VASCULAR | Facility: CLINIC | Age: 72
End: 2022-06-01
Payer: MEDICARE

## 2022-06-01 PROCEDURE — 36415 COLL VENOUS BLD VENIPUNCTURE: CPT

## 2022-06-03 LAB
ALBUMIN SERPL ELPH-MCNC: 4.2 G/DL
ALP BLD-CCNC: 63 U/L
ALT SERPL-CCNC: 19 U/L
ANION GAP SERPL CALC-SCNC: 13 MMOL/L
AST SERPL-CCNC: 23 U/L
BILIRUB SERPL-MCNC: 0.4 MG/DL
BUN SERPL-MCNC: 17 MG/DL
CALCIUM SERPL-MCNC: 9.4 MG/DL
CHLORIDE SERPL-SCNC: 102 MMOL/L
CHOLEST SERPL-MCNC: 141 MG/DL
CO2 SERPL-SCNC: 28 MMOL/L
CREAT SERPL-MCNC: 0.71 MG/DL
EGFR: 91 ML/MIN/1.73M2
GLUCOSE SERPL-MCNC: 83 MG/DL
HDLC SERPL-MCNC: 51 MG/DL
LDLC SERPL CALC-MCNC: 78 MG/DL
NONHDLC SERPL-MCNC: 90 MG/DL
POTASSIUM SERPL-SCNC: 4.1 MMOL/L
PROT SERPL-MCNC: 6.5 G/DL
SODIUM SERPL-SCNC: 143 MMOL/L
TRIGL SERPL-MCNC: 59 MG/DL

## 2022-08-04 ENCOUNTER — APPOINTMENT (OUTPATIENT)
Dept: HEART AND VASCULAR | Facility: CLINIC | Age: 72
End: 2022-08-04

## 2022-08-04 VITALS
WEIGHT: 238 LBS | HEIGHT: 63 IN | TEMPERATURE: 97.2 F | OXYGEN SATURATION: 97 % | BODY MASS INDEX: 42.17 KG/M2 | HEART RATE: 91 BPM | RESPIRATION RATE: 12 BRPM | SYSTOLIC BLOOD PRESSURE: 108 MMHG | DIASTOLIC BLOOD PRESSURE: 67 MMHG

## 2022-08-04 PROCEDURE — 99214 OFFICE O/P EST MOD 30 MIN: CPT

## 2022-08-04 NOTE — REVIEW OF SYSTEMS
[Fever] : no fever [Headache] : no headache [Chills] : no chills [Feeling Fatigued] : not feeling fatigued [SOB] : no shortness of breath [Dyspnea on exertion] : not dyspnea during exertion [Chest Discomfort] : no chest discomfort [Lower Ext Edema] : no extremity edema [Leg Claudication] : no intermittent leg claudication [Palpitations] : no palpitations [Orthopnea] : no orthopnea [PND] : no PND [Syncope] : no syncope [Cough] : no cough [Wheezing] : no wheezing [Coughing Up Blood] : no hemoptysis [Snoring] : no snoring [Dizziness] : no dizziness [Convulsions] : no convulsions [Limb Weakness (Paresis)] : no limb weakness (Paresis) [Confusion] : no confusion was observed [Depression] : no depression [Anxiety] : no anxiety [Under Stress] : not under stress [Suicidal] : not suicidal [Easy Bleeding] : no tendency for easy bleeding [Easy Bruising] : no tendency for easy bruising

## 2022-08-04 NOTE — PHYSICAL EXAM
[Well Developed] : well developed [Well Nourished] : well nourished [No Acute Distress] : no acute distress [Normal Venous Pressure] : normal venous pressure [No Carotid Bruit] : no carotid bruit [Normal S1, S2] : normal S1, S2 [No Murmur] : no murmur [No Rub] : no rub [No Gallop] : no gallop [Clear Lung Fields] : clear lung fields [Good Air Entry] : good air entry [No Respiratory Distress] : no respiratory distress  [No Edema] : no edema [No Cyanosis] : no cyanosis [No Clubbing] : no clubbing [No Varicosities] : no varicosities [Moves all extremities] : moves all extremities [No Focal Deficits] : no focal deficits [Normal Speech] : normal speech [Alert and Oriented] : alert and oriented [Normal memory] : normal memory [de-identified] : Irreg irreg

## 2022-08-04 NOTE — ASSESSMENT
[FreeTextEntry1] : HELEN VALENTIN is a 72 year F with past medical history significant for HTN, HLD, afib, asthma, obesity, found to have newly diagnosed CMP with EF 35% in 9/2020 with now recovered EF.  She is overall doing well and has no cardiac complaints at this time. She is stable and euvolemic at this time.\par \par - I reviewed echo report and discussed the results with the patient .  EF remains stable and recovered \par - I reviewed carotid duplex report and discussed the results with the patient \par - i reviewed labs from 6/1/22\par -cont metoprolol 100 mg daily, now off amiodarone\par -Continue with anticoagulation in setting of high [id number] score \par -etiology of cardiomyopathy is likely tachycardia induced\par -Nonobstructive coronary artery disease on CTA on 6/21/2021\par -cont with beta-blocker and ARB\par \par \par f/u in 4 months or sooner if any symptoms\par -Follow-up over the phone in 2 weeks for echo results and symptom assessment.

## 2022-08-04 NOTE — HISTORY OF PRESENT ILLNESS
[FreeTextEntry1] : HELEN VALENTIN is a 72 year y.o. F with medical history significant for HTN, HLD, afib, asthma, obesity, (HFrEF (EF 35% in 9/2020 with now recovered EF).  \par She is overall in her usual state of health.  She recently had an echo that confirmed recovered LV function \par Compliant with meds\par Can walk 10 blocks\par \par Denies CP, SOB, palpitations, orthopnea, dizziness, syncope, LH, SHORT, edema\par Patient denies changes in her exercise tolerance during the past 6 months. She can walk 10 blocks and can climb  2-3 flights of stairs. \par Sleeps with 1 pillows \par \par She denies history of MI, CVA, DM. \par Denies family history of premature ASCVD and /or SCD\par \par \par EKG (5/16/22) : AF at 98 bpm with normal axis and nonspecific TW abnormality \par \par ECHO (6/27/22): Normal LV size and function. EF 50-55%. Trace MR. Normal PASP\par CAROTID Duplex (5/25/22): No hemodynamically significant stenosis. Estimated stenosis 16-49% b/l. Antegrade flow is identified within both vertebral arteries. \par \par LABS (6/1/22): ; TG 59; LDL 78; HDL 51; nonHDL 90; Cre 0.71; K 4.1; LFTs wnl; eGFR 91

## 2022-08-30 ENCOUNTER — RX RENEWAL (OUTPATIENT)
Age: 72
End: 2022-08-30

## 2022-09-21 ENCOUNTER — NON-APPOINTMENT (OUTPATIENT)
Age: 72
End: 2022-09-21

## 2022-12-05 ENCOUNTER — APPOINTMENT (OUTPATIENT)
Dept: HEART AND VASCULAR | Facility: CLINIC | Age: 72
End: 2022-12-05

## 2023-01-25 ENCOUNTER — APPOINTMENT (OUTPATIENT)
Dept: FAMILY MEDICINE | Facility: CLINIC | Age: 73
End: 2023-01-25
Payer: MEDICARE

## 2023-01-25 ENCOUNTER — NON-APPOINTMENT (OUTPATIENT)
Age: 73
End: 2023-01-25

## 2023-01-25 VITALS
RESPIRATION RATE: 16 BRPM | OXYGEN SATURATION: 96 % | TEMPERATURE: 97.7 F | HEART RATE: 86 BPM | HEIGHT: 63 IN | SYSTOLIC BLOOD PRESSURE: 126 MMHG | BODY MASS INDEX: 41.64 KG/M2 | WEIGHT: 235 LBS | DIASTOLIC BLOOD PRESSURE: 88 MMHG

## 2023-01-25 DIAGNOSIS — Z78.9 OTHER SPECIFIED HEALTH STATUS: ICD-10-CM

## 2023-01-25 DIAGNOSIS — R94.31 ABNORMAL ELECTROCARDIOGRAM [ECG] [EKG]: ICD-10-CM

## 2023-01-25 DIAGNOSIS — R68.89 OTHER GENERAL SYMPTOMS AND SIGNS: ICD-10-CM

## 2023-01-25 DIAGNOSIS — Z87.09 PERSONAL HISTORY OF OTHER DISEASES OF THE RESPIRATORY SYSTEM: ICD-10-CM

## 2023-01-25 PROCEDURE — 36415 COLL VENOUS BLD VENIPUNCTURE: CPT

## 2023-01-25 PROCEDURE — 93000 ELECTROCARDIOGRAM COMPLETE: CPT

## 2023-01-25 NOTE — ASSESSMENT
[FreeTextEntry1] : 73 yo F with hx of Afib (on Xarelto), HFrEF, HTN and CAD/cardiomyopathy presents with c/o feeling faint and light headed.\par She reports having these episodes chronically, however occurred again during encounter. \par

## 2023-01-25 NOTE — REVIEW OF SYSTEMS
[Fatigue] : fatigue [Palpitations] : palpitations [Wheezing] : wheezing [Dyspnea on Exertion] : dyspnea on exertion [Joint Pain] : joint pain [Muscle Pain] : muscle pain [Headache] : headache [Memory Loss] : memory loss [Negative] : Heme/Lymph [Fever] : no fever [Chills] : no chills [Hot Flashes] : no hot flashes [Night Sweats] : no night sweats [Recent Change In Weight] : ~T no recent weight change [Chest Pain] : no chest pain [Shortness Of Breath] : no shortness of breath [Cough] : no cough [Joint Stiffness] : no joint stiffness [Joint Swelling] : no joint swelling [Muscle Weakness] : no muscle weakness [Back Pain] : no back pain [Dizziness] : no dizziness [Fainting] : no fainting [Confusion] : no confusion [Unsteady Walking] : no ataxia [FreeTextEntry9] : knee and leg pain [de-identified] : lightheaded, forgetful

## 2023-01-25 NOTE — PLAN
[FreeTextEntry1] : \par \par #Afib \par #HFrEF\par -EKG done today in office- ectopic ventricular beats and afib\par \par #Presyncope\par -Will send patient to ED via EMS for further evaluation \par \par Labs drawn in office

## 2023-01-25 NOTE — HISTORY OF PRESENT ILLNESS
[FreeTextEntry8] : 73 yo F with hx of Afib (on Xarelto), HFrEF, HTN and CAD/cardiomyopathy presents with c/o feeling faint and light headed.\par She reports having these episodes chronically, however occurred again during encounter. \par \par She also c/o knee and leg pain, fatigue and forgetfulness. \par \par \par Social Hx:\par Patient has 3 children (54, 49, 42 yo) living in [place] and [place] \par  \par Lives alone in [place]\par Retired\par \par Previous PCP- Dr. Delarosa \par \par \par

## 2023-01-25 NOTE — PHYSICAL EXAM
[No Acute Distress] : no acute distress [Well Nourished] : well nourished [Well Developed] : well developed [Well-Appearing] : well-appearing [Normal Sclera/Conjunctiva] : normal sclera/conjunctiva [PERRL] : pupils equal round and reactive to light [EOMI] : extraocular movements intact [Normal Outer Ear/Nose] : the outer ears and nose were normal in appearance [Normal Oropharynx] : the oropharynx was normal [No JVD] : no jugular venous distention [No Lymphadenopathy] : no lymphadenopathy [Supple] : supple [Thyroid Normal, No Nodules] : the thyroid was normal and there were no nodules present [No Respiratory Distress] : no respiratory distress  [No Accessory Muscle Use] : no accessory muscle use [Clear to Auscultation] : lungs were clear to auscultation bilaterally [Normal Rate] : normal rate  [Normal S1, S2] : normal S1 and S2 [No Murmur] : no murmur heard [No Carotid Bruits] : no carotid bruits [No Abdominal Bruit] : a ~M bruit was not heard ~T in the abdomen [No Varicosities] : no varicosities [Pedal Pulses Present] : the pedal pulses are present [No Edema] : there was no peripheral edema [No Palpable Aorta] : no palpable aorta [No Extremity Clubbing/Cyanosis] : no extremity clubbing/cyanosis [Soft] : abdomen soft [Non Tender] : non-tender [Non-distended] : non-distended [No Masses] : no abdominal mass palpated [No HSM] : no HSM [Normal Bowel Sounds] : normal bowel sounds [Normal Posterior Cervical Nodes] : no posterior cervical lymphadenopathy [Normal Anterior Cervical Nodes] : no anterior cervical lymphadenopathy [No CVA Tenderness] : no CVA  tenderness [No Spinal Tenderness] : no spinal tenderness [No Joint Swelling] : no joint swelling [Grossly Normal Strength/Tone] : grossly normal strength/tone [No Rash] : no rash [Coordination Grossly Intact] : coordination grossly intact [No Focal Deficits] : no focal deficits [Normal Gait] : normal gait [Deep Tendon Reflexes (DTR)] : deep tendon reflexes were 2+ and symmetric [Normal Affect] : the affect was normal [Normal Insight/Judgement] : insight and judgment were intact [de-identified] : + irregular rhythm

## 2023-01-27 LAB
25(OH)D3 SERPL-MCNC: 13.2 NG/ML
ALBUMIN SERPL ELPH-MCNC: 4.2 G/DL
ALP BLD-CCNC: 101 U/L
ALT SERPL-CCNC: 52 U/L
ANION GAP SERPL CALC-SCNC: 16 MMOL/L
APPEARANCE: CLEAR
AST SERPL-CCNC: 54 U/L
BACTERIA: NEGATIVE
BASOPHILS # BLD AUTO: 0.03 K/UL
BASOPHILS NFR BLD AUTO: 0.3 %
BILIRUB SERPL-MCNC: 0.3 MG/DL
BILIRUBIN URINE: NEGATIVE
BLOOD URINE: ABNORMAL
BUN SERPL-MCNC: 21 MG/DL
CALCIUM SERPL-MCNC: 9.6 MG/DL
CHLORIDE SERPL-SCNC: 105 MMOL/L
CHOLEST SERPL-MCNC: 164 MG/DL
CO2 SERPL-SCNC: 24 MMOL/L
COLOR: COLORLESS
CREAT SERPL-MCNC: 0.75 MG/DL
CREAT SPEC-SCNC: 24 MG/DL
EGFR: 85 ML/MIN/1.73M2
EOSINOPHIL # BLD AUTO: 0.09 K/UL
EOSINOPHIL NFR BLD AUTO: 0.9 %
ESTIMATED AVERAGE GLUCOSE: 126 MG/DL
GLUCOSE QUALITATIVE U: NEGATIVE
GLUCOSE SERPL-MCNC: 103 MG/DL
HBA1C MFR BLD HPLC: 6 %
HCT VFR BLD CALC: 39.8 %
HDLC SERPL-MCNC: 60 MG/DL
HGB BLD-MCNC: 12 G/DL
HYALINE CASTS: 0 /LPF
IMM GRANULOCYTES NFR BLD AUTO: 0.3 %
KETONES URINE: NEGATIVE
LDLC SERPL CALC-MCNC: 87 MG/DL
LEUKOCYTE ESTERASE URINE: NEGATIVE
LYMPHOCYTES # BLD AUTO: 2.16 K/UL
LYMPHOCYTES NFR BLD AUTO: 22.1 %
MAGNESIUM SERPL-MCNC: 1.8 MG/DL
MAN DIFF?: NORMAL
MCHC RBC-ENTMCNC: 26.4 PG
MCHC RBC-ENTMCNC: 30.2 GM/DL
MCV RBC AUTO: 87.5 FL
MICROALBUMIN 24H UR DL<=1MG/L-MCNC: <1.2 MG/DL
MICROALBUMIN/CREAT 24H UR-RTO: NORMAL MG/G
MICROSCOPIC-UA: NORMAL
MONOCYTES # BLD AUTO: 0.71 K/UL
MONOCYTES NFR BLD AUTO: 7.3 %
NEUTROPHILS # BLD AUTO: 6.77 K/UL
NEUTROPHILS NFR BLD AUTO: 69.1 %
NITRITE URINE: NEGATIVE
NONHDLC SERPL-MCNC: 104 MG/DL
NT-PROBNP SERPL-MCNC: 1505 PG/ML
PH URINE: 6.5
PHOSPHATE SERPL-MCNC: 3.6 MG/DL
PLATELET # BLD AUTO: 244 K/UL
POTASSIUM SERPL-SCNC: 4.2 MMOL/L
PROT SERPL-MCNC: 6.7 G/DL
PROTEIN URINE: NEGATIVE
RBC # BLD: 4.55 M/UL
RBC # FLD: 14.8 %
RED BLOOD CELLS URINE: 0 /HPF
SODIUM SERPL-SCNC: 145 MMOL/L
SPECIFIC GRAVITY URINE: 1.01
SQUAMOUS EPITHELIAL CELLS: 0 /HPF
TRIGL SERPL-MCNC: 83 MG/DL
TSH SERPL-ACNC: 0.79 UIU/ML
UROBILINOGEN URINE: NORMAL
VIT B12 SERPL-MCNC: 744 PG/ML
WBC # FLD AUTO: 9.79 K/UL
WHITE BLOOD CELLS URINE: 0 /HPF

## 2023-01-30 ENCOUNTER — APPOINTMENT (OUTPATIENT)
Dept: HEART AND VASCULAR | Facility: CLINIC | Age: 73
End: 2023-01-30
Payer: MEDICARE

## 2023-01-30 VITALS
HEART RATE: 92 BPM | OXYGEN SATURATION: 95 % | RESPIRATION RATE: 15 BRPM | SYSTOLIC BLOOD PRESSURE: 105 MMHG | DIASTOLIC BLOOD PRESSURE: 68 MMHG | BODY MASS INDEX: 41.64 KG/M2 | TEMPERATURE: 98 F | HEIGHT: 63 IN | WEIGHT: 235 LBS

## 2023-01-30 PROCEDURE — 99214 OFFICE O/P EST MOD 30 MIN: CPT

## 2023-01-30 RX ORDER — HYDROCHLOROTHIAZIDE 25 MG/1
25 TABLET ORAL DAILY
Qty: 90 | Refills: 1 | Status: DISCONTINUED | COMMUNITY
Start: 2022-01-06 | End: 2023-01-30

## 2023-01-30 NOTE — REVIEW OF SYSTEMS
[Feeling Fatigued] : feeling fatigued [Dyspnea on exertion] : dyspnea during exertion [Lower Ext Edema] : lower extremity edema [Orthopnea] : orthopnea [Fever] : no fever [Headache] : no headache [Chills] : no chills [SOB] : no shortness of breath [Chest Discomfort] : no chest discomfort [Leg Claudication] : no intermittent leg claudication [Palpitations] : no palpitations [PND] : no PND [Syncope] : no syncope [Cough] : no cough [Wheezing] : no wheezing [Coughing Up Blood] : no hemoptysis [Snoring] : no snoring [Dizziness] : no dizziness [Convulsions] : no convulsions [Limb Weakness (Paresis)] : no limb weakness (Paresis) [Confusion] : no confusion was observed [Depression] : no depression [Anxiety] : no anxiety [Under Stress] : not under stress [Suicidal] : not suicidal [Easy Bleeding] : no tendency for easy bleeding [Easy Bruising] : no tendency for easy bruising

## 2023-01-30 NOTE — PHYSICAL EXAM
[Well Developed] : well developed [Well Nourished] : well nourished [No Acute Distress] : no acute distress [Normal Venous Pressure] : normal venous pressure [No Carotid Bruit] : no carotid bruit [Normal S1, S2] : normal S1, S2 [No Murmur] : no murmur [No Rub] : no rub [No Gallop] : no gallop [Clear Lung Fields] : clear lung fields [Good Air Entry] : good air entry [No Respiratory Distress] : no respiratory distress  [No Cyanosis] : no cyanosis [No Clubbing] : no clubbing [No Varicosities] : no varicosities [Moves all extremities] : moves all extremities [No Focal Deficits] : no focal deficits [Normal Speech] : normal speech [Alert and Oriented] : alert and oriented [Normal memory] : normal memory [Edema ___] : edema [unfilled] [de-identified] : Irreg irreg

## 2023-01-30 NOTE — ASSESSMENT
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  She reports feeling more fatigue and tired. +SHORT. Sometimes feels LH . On exam she has trace leg edema. She is breathing comfortably \par \par - stop HCTZ\par - take lasix 80 mg x 3 days and then take 40mg once daily \par - continue taking other cardiac meds for now\par - will plan to switch losartan for entresto. \par - i reviewed labs from 1/25/23\par -Nonobstructive coronary artery disease on CTA on 6/21/2021\par \par \par RTO in 1 wk.

## 2023-01-30 NOTE — HISTORY OF PRESENT ILLNESS
[FreeTextEntry1] : HELEN VALENTIN is a 72 year y.o. F with medical history significant for HTN, HLD, afib, asthma, obesity, (HFrEF (EF 35% in 9/2020 with now recovered EF [6/2022]).    \par She reports feeling more fatigue and tired during the past few months. +SHORT. \par She also reports having episodes of LH and feeling like she is going to faint a/w palpitations \par +mild intermittent ankle edema \par She also increased the amount of pillows she uses do to dyspnea. \par recent labs are remarkable for elevated proBNP levels and mildly elevated transaminases \par Compliant with meds\par \par Denies CP, SOB, syncope, edema\par Patient denies changes in her exercise tolerance during the past 6 months. She can walk 10 blocks and can climb  2-3 flights of stairs. \par Sleeps with 2-3 pillows \par \par She denies history of MI, CVA, DM. \par Denies family history of premature ASCVD and /or SCD\par \par \par ECG (1/25/23): AF at 77 bpm w nl axis, nonspecific TW abn and PVC\par EKG (5/16/22) : AF at 98 bpm with normal axis and nonspecific TW abnormality \par \par ECHO (6/27/22): Normal LV size and function. EF 50-55%. Trace MR. Normal PASP\par CAROTID Duplex (5/25/22): No hemodynamically significant stenosis. Estimated stenosis 16-49% b/l. Antegrade flow is identified within both vertebral arteries. \par \par LABS (1/25/23): proBNP 1505; Hgb 12; Hct 39.8; A1c 6.0; TSH 0.79; ; TG 83; LDL 87; HDL 60; NonHDL 104; Cre 0.75; K 4.2; AST 54; ALT 52; eGFR 85\par \par  (6/1/22): ; TG 59; LDL 78; HDL 51; nonHDL 90; Cre 0.71; K 4.1; LFTs wnl; eGFR 91

## 2023-02-02 ENCOUNTER — APPOINTMENT (OUTPATIENT)
Dept: PULMONOLOGY | Facility: CLINIC | Age: 73
End: 2023-02-02
Payer: MEDICARE

## 2023-02-02 VITALS
TEMPERATURE: 98.4 F | SYSTOLIC BLOOD PRESSURE: 109 MMHG | HEART RATE: 88 BPM | RESPIRATION RATE: 14 BRPM | DIASTOLIC BLOOD PRESSURE: 67 MMHG | OXYGEN SATURATION: 98 %

## 2023-02-02 DIAGNOSIS — R35.1 NOCTURIA: ICD-10-CM

## 2023-02-02 DIAGNOSIS — G47.8 OTHER SLEEP DISORDERS: ICD-10-CM

## 2023-02-02 PROCEDURE — 94729 DIFFUSING CAPACITY: CPT

## 2023-02-02 PROCEDURE — 94060 EVALUATION OF WHEEZING: CPT | Mod: 59

## 2023-02-02 PROCEDURE — 94726 PLETHYSMOGRAPHY LUNG VOLUMES: CPT

## 2023-02-02 PROCEDURE — 99213 OFFICE O/P EST LOW 20 MIN: CPT | Mod: 25

## 2023-02-02 NOTE — HISTORY OF PRESENT ILLNESS
[Former] : former [Never] : never [TextBox_4] : Patient is a 72-year-old obese female past medical history significant recently diagnosed with congestive heart failure with reduced EF.  The patient had an EF of 35% in 2020.  Patient was also recently diagnosed with atrial fibrillation and was treated by cardiology.  She presents today complaining of nonrestorative sleep loud snoring and morning headaches

## 2023-02-02 NOTE — ASSESSMENT
[FreeTextEntry1] : In summary the patient is a 72-year-old obese female past medical history significant for heart failure with reduced EF atrial fibrillation hypertension who presents for pulmonary consult.  The patient's history and physical are consistent with obstructive sleep apnea.  A home sleep monitor has been ordered.\par \par A pulmonary function test revealed moderate restrictive and obstructive lung disease.  The patient is started on Trelegy and is instructed to follow-up in 2 weeks

## 2023-02-06 ENCOUNTER — APPOINTMENT (OUTPATIENT)
Dept: HEART AND VASCULAR | Facility: CLINIC | Age: 73
End: 2023-02-06

## 2023-02-08 ENCOUNTER — APPOINTMENT (OUTPATIENT)
Dept: FAMILY MEDICINE | Facility: CLINIC | Age: 73
End: 2023-02-08
Payer: MEDICARE

## 2023-02-08 VITALS
TEMPERATURE: 97.8 F | HEIGHT: 63 IN | SYSTOLIC BLOOD PRESSURE: 117 MMHG | OXYGEN SATURATION: 94 % | WEIGHT: 240 LBS | RESPIRATION RATE: 16 BRPM | DIASTOLIC BLOOD PRESSURE: 77 MMHG | BODY MASS INDEX: 42.52 KG/M2 | HEART RATE: 87 BPM

## 2023-02-08 DIAGNOSIS — R31.9 HEMATURIA, UNSPECIFIED: ICD-10-CM

## 2023-02-08 DIAGNOSIS — E55.9 VITAMIN D DEFICIENCY, UNSPECIFIED: ICD-10-CM

## 2023-02-08 DIAGNOSIS — R06.09 OTHER FORMS OF DYSPNEA: ICD-10-CM

## 2023-02-08 PROCEDURE — 99214 OFFICE O/P EST MOD 30 MIN: CPT

## 2023-02-09 ENCOUNTER — APPOINTMENT (OUTPATIENT)
Dept: HEART AND VASCULAR | Facility: CLINIC | Age: 73
End: 2023-02-09
Payer: MEDICARE

## 2023-02-09 ENCOUNTER — NON-APPOINTMENT (OUTPATIENT)
Age: 73
End: 2023-02-09

## 2023-02-09 VITALS
RESPIRATION RATE: 16 BRPM | WEIGHT: 240 LBS | HEIGHT: 63 IN | HEART RATE: 60 BPM | TEMPERATURE: 98.4 F | DIASTOLIC BLOOD PRESSURE: 82 MMHG | OXYGEN SATURATION: 97 % | BODY MASS INDEX: 42.52 KG/M2 | SYSTOLIC BLOOD PRESSURE: 119 MMHG

## 2023-02-09 PROCEDURE — 99214 OFFICE O/P EST MOD 30 MIN: CPT

## 2023-02-09 RX ORDER — LOSARTAN POTASSIUM 50 MG/1
50 TABLET, FILM COATED ORAL DAILY
Qty: 90 | Refills: 1 | Status: DISCONTINUED | COMMUNITY
Start: 2021-10-07 | End: 2023-02-09

## 2023-02-09 NOTE — ASSESSMENT
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  She feels much better. Fatigue, breathing and leg edema have improved since starting lasix.  \par \par - stop losartan. Start entresto 24-26 mg bid 36 hr after stopping losartan \par - once entresto is started, decrease lasix to 20 mg daily \par - continue taking other cardiac meds\par - Nonobstructive coronary artery disease on CTA on 6/21/2021\par - Increase ambulation as tolerated to aim for approximately 30 minutes of moderate activity 5 days a week.  Heart healthy diet low in sodium, sugars and saturated fats and high in fruits, vegetables and whole grains.  Weight loss.\par \par \par \par RTO in 3 wks.

## 2023-02-09 NOTE — HISTORY OF PRESENT ILLNESS
[FreeTextEntry1] : HELEN VALENTIN is a 72 year y.o. F with medical history significant for HTN, HLD, afib, asthma, obesity, (HFrEF (EF 35% in 9/2020 with now recovered EF [6/2022]).    \par She is here for follow-up.  \par She feels much better since starting diuretic. SOB, leg edema, palpitations and fatigue have improved.  \par Orthopnea resolved. \par BP remains well controlled. \par She is compliant with meds\par \par Denies CP, SOB, syncope, edema\par Patient denies changes in her exercise tolerance during the past 6 months. She can walk 10 blocks and can climb  2-3 flights of stairs. \par Sleeps with 2 pillows \par \par She denies history of MI, CVA, DM. \par Denies family history of premature ASCVD and /or SCD\par \par \par ECG (1/25/23): AF at 77 bpm w nl axis, nonspecific TW abn and PVC\par EKG (5/16/22) : AF at 98 bpm with normal axis and nonspecific TW abnormality \par \par ECHO (6/27/22): Normal LV size and function. EF 50-55%. Trace MR. Normal PASP\par CAROTID Duplex (5/25/22): No hemodynamically significant stenosis. Estimated stenosis 16-49% b/l. Antegrade flow is identified within both vertebral arteries. \par \par LABS (1/25/23): proBNP 1505; Hgb 12; Hct 39.8; A1c 6.0; TSH 0.79; ; TG 83; LDL 87; HDL 60; NonHDL 104; Cre 0.75; K 4.2; AST 54; ALT 52; eGFR 85\par \par  (6/1/22): ; TG 59; LDL 78; HDL 51; nonHDL 90; Cre 0.71; K 4.1; LFTs wnl; eGFR 91

## 2023-02-09 NOTE — PHYSICAL EXAM
[Well Developed] : well developed [Well Nourished] : well nourished [No Acute Distress] : no acute distress [Normal Venous Pressure] : normal venous pressure [No Carotid Bruit] : no carotid bruit [Normal S1, S2] : normal S1, S2 [No Murmur] : no murmur [No Rub] : no rub [No Gallop] : no gallop [Clear Lung Fields] : clear lung fields [Good Air Entry] : good air entry [No Respiratory Distress] : no respiratory distress  [No Cyanosis] : no cyanosis [No Clubbing] : no clubbing [No Varicosities] : no varicosities [Edema ___] : edema [unfilled] [Moves all extremities] : moves all extremities [No Focal Deficits] : no focal deficits [Normal Speech] : normal speech [Alert and Oriented] : alert and oriented [Normal memory] : normal memory [de-identified] : Irreg irreg

## 2023-02-10 LAB — BACTERIA UR CULT: NORMAL

## 2023-02-15 ENCOUNTER — APPOINTMENT (OUTPATIENT)
Dept: INTERNAL MEDICINE | Facility: CLINIC | Age: 73
End: 2023-02-15

## 2023-02-15 PROBLEM — R06.09 DYSPNEA ON EXERTION: Status: ACTIVE | Noted: 2021-05-13

## 2023-02-15 PROBLEM — E55.9 VITAMIN D DEFICIENCY: Status: ACTIVE | Noted: 2023-01-27

## 2023-02-15 PROBLEM — R31.9 BLOOD IN URINE: Status: ACTIVE | Noted: 2023-02-08

## 2023-02-15 LAB
APPEARANCE: CLEAR
BACTERIA: NEGATIVE
BILIRUBIN URINE: NEGATIVE
BLOOD URINE: NEGATIVE
COLOR: YELLOW
GLUCOSE QUALITATIVE U: NEGATIVE
HYALINE CASTS: 1 /LPF
KETONES URINE: NEGATIVE
LEUKOCYTE ESTERASE URINE: NEGATIVE
MICROSCOPIC-UA: NORMAL
NITRITE URINE: NEGATIVE
PH URINE: 6
PROTEIN URINE: NORMAL
RED BLOOD CELLS URINE: 4 /HPF
SPECIFIC GRAVITY URINE: 1.03
SQUAMOUS EPITHELIAL CELLS: 1 /HPF
UROBILINOGEN URINE: ABNORMAL
WHITE BLOOD CELLS URINE: 1 /HPF

## 2023-02-15 NOTE — PHYSICAL EXAM
[No Acute Distress] : no acute distress [Well Developed] : well developed [Well-Appearing] : well-appearing [Normal Sclera/Conjunctiva] : normal sclera/conjunctiva [EOMI] : extraocular movements intact [Supple] : supple [No Respiratory Distress] : no respiratory distress  [No Accessory Muscle Use] : no accessory muscle use [Clear to Auscultation] : lungs were clear to auscultation bilaterally [Normal Rate] : normal rate  [Regular Rhythm] : with a regular rhythm [Normal S1, S2] : normal S1 and S2 [Soft] : abdomen soft [Non Tender] : non-tender [Non-distended] : non-distended [No Masses] : no abdominal mass palpated [Grossly Normal Strength/Tone] : grossly normal strength/tone [No Rash] : no rash [No Focal Deficits] : no focal deficits [Speech Grossly Normal] : speech grossly normal [Normal Affect] : the affect was normal [Normal Mood] : the mood was normal [Normal Insight/Judgement] : insight and judgment were intact [de-identified] : b/l 1-2 + pitting edema

## 2023-02-15 NOTE — PLAN
[FreeTextEntry1] : \par \par #Afib- on Xarelto\par #Lightheadedness- resolved\par #Dizziness- resolved\par #Hx of chest pain\par #CAD s/p stent\par #HFrEF\par #Peripheral edema\par -Cardiology evaluation reviewed\par -Continue Metoprolol, Furosemide, and Entresto\par -Continue atorvastatin \par \par #Dyspnea on exertion\par -Pulmonary evaluation reviewed- patient started on Trelegy\par \par #HTN\par -BP stable\par -Continue metoprolol and furosemide\par \par #Blood in urine\par -Urinalysis + microscopy\par \par #Prediabetes\par #Elevated LFTs\par -A1C- 6.0%\par -Low carbohydrate, low fat diet and exercise recommended\par \par #Vitamin D deficiency\par -Vitamin D3 50,000 units once a week x 12 weeks\par \par F/u in 3 months

## 2023-02-15 NOTE — HISTORY OF PRESENT ILLNESS
[FreeTextEntry1] : f/u light headedness [de-identified] : 71 yo F with hx of Afib (on Xarelto), HFrEF, HTN and CAD/cardiomyopathy presents for follow up after being sent to the ED for light headedness and dizziness.\par \par Patient reports feeling better after stopping hydrochlorothiazide.\par She denies light headedness, dizziness, chest pain or shortness of breath.

## 2023-02-16 ENCOUNTER — APPOINTMENT (OUTPATIENT)
Dept: PULMONOLOGY | Facility: CLINIC | Age: 73
End: 2023-02-16
Payer: MEDICARE

## 2023-02-16 VITALS
RESPIRATION RATE: 14 BRPM | OXYGEN SATURATION: 97 % | DIASTOLIC BLOOD PRESSURE: 73 MMHG | TEMPERATURE: 97.5 F | SYSTOLIC BLOOD PRESSURE: 113 MMHG | HEART RATE: 72 BPM

## 2023-02-16 DIAGNOSIS — R06.83 SNORING: ICD-10-CM

## 2023-02-16 DIAGNOSIS — R06.02 SHORTNESS OF BREATH: ICD-10-CM

## 2023-02-16 PROCEDURE — 99213 OFFICE O/P EST LOW 20 MIN: CPT

## 2023-02-16 NOTE — HISTORY OF PRESENT ILLNESS
[TextBox_4] : Patient is a 72-year-old female past medical history significant for A-fib heart failure with reduced EF hypertension dyslipidemia who presents for follow-up.  The patient's shortness of breath dyspnea on exertion have improved with current medications.  She is forgetful at times and is currently awaiting a home sleep monitor

## 2023-02-16 NOTE — ASSESSMENT
[FreeTextEntry1] : In summary the patient is a 72-year-old obese female past medical history significant for heart failure with reduced EF hypertension dyslipidemia atrial fibrillation possible obstructive sleep apnea who presents for follow-up.  Patient's physical exam is significant for improved air entry bilaterally.\par \par Patient is instructed to continue current medications, prescription renewal performed.  Home sleep monitor is pending and she is instructed to follow-up in 3 months

## 2023-03-06 ENCOUNTER — APPOINTMENT (OUTPATIENT)
Dept: HEART AND VASCULAR | Facility: CLINIC | Age: 73
End: 2023-03-06
Payer: MEDICARE

## 2023-03-06 VITALS
WEIGHT: 240 LBS | DIASTOLIC BLOOD PRESSURE: 75 MMHG | HEART RATE: 98 BPM | OXYGEN SATURATION: 98 % | HEIGHT: 63 IN | SYSTOLIC BLOOD PRESSURE: 113 MMHG | BODY MASS INDEX: 42.52 KG/M2 | RESPIRATION RATE: 16 BRPM | TEMPERATURE: 98 F

## 2023-03-06 PROCEDURE — 93270 REMOTE 30 DAY ECG REV/REPORT: CPT | Mod: 59

## 2023-03-06 PROCEDURE — 99214 OFFICE O/P EST MOD 30 MIN: CPT | Mod: 25

## 2023-03-06 NOTE — ASSESSMENT
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  She feels much better. Fatigue, breathing and leg edema have improved since starting lasix and entresto. Now c/o brief dizzy spells a/w palpitations. On exam trace leg edema. She is on low dose lasix \par \par - place a holter x 1wk. \par - check proBNP\par - continue taking cardiac meds\par - Nonobstructive coronary artery disease on CTA on 6/21/2021\par - Increase ambulation as tolerated to aim for approximately 30 minutes of moderate activity 5 days a week.  Heart healthy diet low in sodium, sugars and saturated fats and high in fruits, vegetables and whole grains.  Weight loss.\par \par \par \par RTO in 3 wks.

## 2023-03-06 NOTE — PHYSICAL EXAM
[Well Developed] : well developed [Well Nourished] : well nourished [No Acute Distress] : no acute distress [Normal Venous Pressure] : normal venous pressure [No Carotid Bruit] : no carotid bruit [Normal S1, S2] : normal S1, S2 [No Murmur] : no murmur [No Rub] : no rub [No Gallop] : no gallop [Clear Lung Fields] : clear lung fields [Good Air Entry] : good air entry [No Respiratory Distress] : no respiratory distress  [No Cyanosis] : no cyanosis [No Clubbing] : no clubbing [No Varicosities] : no varicosities [Moves all extremities] : moves all extremities [No Focal Deficits] : no focal deficits [Normal Speech] : normal speech [Alert and Oriented] : alert and oriented [Normal memory] : normal memory [Edema ___] : edema [unfilled] [de-identified] : Irreg irreg

## 2023-03-06 NOTE — REVIEW OF SYSTEMS
[Palpitations] : palpitations [Dizziness] : dizziness [Fever] : no fever [Headache] : no headache [Chills] : no chills [Feeling Fatigued] : not feeling fatigued [SOB] : no shortness of breath [Dyspnea on exertion] : not dyspnea during exertion [Chest Discomfort] : no chest discomfort [Lower Ext Edema] : no extremity edema [Leg Claudication] : no intermittent leg claudication [Orthopnea] : no orthopnea [PND] : no PND [Syncope] : no syncope [Cough] : no cough [Wheezing] : no wheezing [Coughing Up Blood] : no hemoptysis [Snoring] : no snoring [Convulsions] : no convulsions [Limb Weakness (Paresis)] : no limb weakness (Paresis) [Confusion] : no confusion was observed [Depression] : no depression [Anxiety] : no anxiety [Under Stress] : not under stress [Suicidal] : not suicidal [Easy Bleeding] : no tendency for easy bleeding [Easy Bruising] : no tendency for easy bruising

## 2023-03-06 NOTE — HISTORY OF PRESENT ILLNESS
[FreeTextEntry1] : HELEN VALENTIN is a 72 year y.o. F with medical history significant for HTN, HLD, afib, asthma, obesity, (HFrEF (EF 35% in 9/2020 with now recovered EF [6/2022]).    \par She is here for follow-up.  \par She started taking entresto and reduced lasix to 20mg daily \par She states SOB, leg edema and fatigue have improved. However, she has frequent dizzy spells a/w palpitations. It is brief and only last a few seconds. No syncope. She states symptoms pass very fast and she does not have time to check her vitals. \par  \par BP remains well controlled. \par She is compliant with meds\par \par Denies CP, SOB, syncope, edema\par Patient denies changes in her exercise tolerance during the past 6 months. She can walk 10 blocks and can climb  2-3 flights of stairs. \par Sleeps with 2 pillows \par \par She denies history of MI, CVA, DM. \par Denies family history of premature ASCVD and /or SCD\par \par \par ECG (1/25/23): AF at 77 bpm w nl axis, nonspecific TW abn and PVC\par EKG (5/16/22) : AF at 98 bpm with normal axis and nonspecific TW abnormality \par \par ECHO (6/27/22): Normal LV size and function. EF 50-55%. Trace MR. Normal PASP\par CAROTID Duplex (5/25/22): No hemodynamically significant stenosis. Estimated stenosis 16-49% b/l. Antegrade flow is identified within both vertebral arteries. \par \par LABS (1/25/23): proBNP 1505; Hgb 12; Hct 39.8; A1c 6.0; TSH 0.79; ; TG 83; LDL 87; HDL 60; NonHDL 104; Cre 0.75; K 4.2; AST 54; ALT 52; eGFR 85\par \par  (6/1/22): ; TG 59; LDL 78; HDL 51; nonHDL 90; Cre 0.71; K 4.1; LFTs wnl; eGFR 91

## 2023-03-09 LAB — NT-PROBNP SERPL-MCNC: 1597 PG/ML

## 2023-04-03 ENCOUNTER — APPOINTMENT (OUTPATIENT)
Dept: HEART AND VASCULAR | Facility: CLINIC | Age: 73
End: 2023-04-03
Payer: MEDICARE

## 2023-04-03 VITALS
BODY MASS INDEX: 44.16 KG/M2 | HEART RATE: 85 BPM | HEIGHT: 62 IN | DIASTOLIC BLOOD PRESSURE: 70 MMHG | SYSTOLIC BLOOD PRESSURE: 121 MMHG | OXYGEN SATURATION: 97 % | WEIGHT: 240 LBS | RESPIRATION RATE: 16 BRPM | TEMPERATURE: 98.2 F

## 2023-04-03 PROCEDURE — 99214 OFFICE O/P EST MOD 30 MIN: CPT

## 2023-04-06 NOTE — HISTORY OF PRESENT ILLNESS
[FreeTextEntry1] : HELEN VALENTIN is a 72 year y.o. F with medical history significant for HTN, HLD, afib, asthma, obesity, (HFrEF (EF 35% in 9/2020 with now recovered EF [6/2022]).    \par She is here for follow-up.  \par She states she is feeling better since increasing lasix dose (alternating lasix 40mg with 20mg every other days). \par She states SOB, leg edema and fatigue have improved.\par Palpitations and dizzy spells resolved. \par  \par BP remains well controlled. \par She is compliant with meds\par \par Denies CP, SOB, syncope, edema\par Patient denies changes in her exercise tolerance during the past 6 months. She can walk 10 blocks and can climb  2-3 flights of stairs. \par Sleeps with 2 pillows \par \par She denies history of MI, CVA, DM. \par Denies family history of premature ASCVD and /or SCD\par \par \par ECG (1/25/23): AF at 77 bpm w nl axis, nonspecific TW abn and PVC\par EKG (5/16/22) : AF at 98 bpm with normal axis and nonspecific TW abnormality \par \par Holter (3/6/23): Min 40 bpm, Avg 81 bpm, Max 148 bpm. AFib (100%). PVCs (3%). No VT/pauses  \par \par ECHO (6/27/22): Normal LV size and function. EF 50-55%. Trace MR. Normal PASP\par \par CAROTID Duplex (5/25/22): No hemodynamically significant stenosis. Estimated stenosis 16-49% b/l. Antegrade flow is identified within both vertebral arteries. \par \par LABS (3/6/23):: ProBNP 1597\par (1/25/23): proBNP 1505; Hgb 12; Hct 39.8; A1c 6.0; TSH 0.79; ; TG 83; LDL 87; HDL 60; NonHDL 104; Cre 0.75; K 4.2; AST 54; ALT 52; eGFR 85\par (6/1/22): ; TG 59; LDL 78; HDL 51; nonHDL 90; Cre 0.71; K 4.1; LFTs wnl; eGFR 91

## 2023-04-06 NOTE — PHYSICAL EXAM
[Well Developed] : well developed [Well Nourished] : well nourished [No Acute Distress] : no acute distress [Normal Venous Pressure] : normal venous pressure [No Carotid Bruit] : no carotid bruit [Normal S1, S2] : normal S1, S2 [No Murmur] : no murmur [No Rub] : no rub [No Gallop] : no gallop [Clear Lung Fields] : clear lung fields [Good Air Entry] : good air entry [No Respiratory Distress] : no respiratory distress  [No Cyanosis] : no cyanosis [No Clubbing] : no clubbing [No Varicosities] : no varicosities [Moves all extremities] : moves all extremities [No Focal Deficits] : no focal deficits [Normal Speech] : normal speech [Alert and Oriented] : alert and oriented [Normal memory] : normal memory [No Edema] : no edema [de-identified] : Irreg irreg

## 2023-04-06 NOTE — ASSESSMENT
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  She feels much better. She is currently compensated and euvolemic.\par \par - I reviewed Holter report and discussed the results with the patient --> AF average HR 81 bpm. occ PVCs. \par - continue taking cardiac meds (entresto, metoprolol, lasix, atorvastatin, xarelto)\par - Nonobstructive coronary artery disease on CTA on 6/21/2021\par - daily weight monitoring \par - Increase ambulation as tolerated to aim for approximately 30 minutes of moderate activity 5 days a week.  Heart healthy diet low in sodium, sugars and saturated fats and high in fruits, vegetables and whole grains.  Weight loss.\par \par \par \par RTO in 2-3 mo.

## 2023-04-06 NOTE — REVIEW OF SYSTEMS
[Dizziness] : dizziness [Fever] : no fever [Headache] : no headache [Chills] : no chills [Feeling Fatigued] : not feeling fatigued [SOB] : no shortness of breath [Dyspnea on exertion] : not dyspnea during exertion [Chest Discomfort] : no chest discomfort [Lower Ext Edema] : no extremity edema [Leg Claudication] : no intermittent leg claudication [Palpitations] : no palpitations [Orthopnea] : no orthopnea [PND] : no PND [Syncope] : no syncope [Cough] : no cough [Wheezing] : no wheezing [Coughing Up Blood] : no hemoptysis [Snoring] : no snoring [Convulsions] : no convulsions [Limb Weakness (Paresis)] : no limb weakness (Paresis) [Confusion] : no confusion was observed [Depression] : no depression [Anxiety] : no anxiety [Under Stress] : not under stress [Suicidal] : not suicidal [Easy Bleeding] : no tendency for easy bleeding [Easy Bruising] : no tendency for easy bruising

## 2023-04-21 ENCOUNTER — APPOINTMENT (OUTPATIENT)
Dept: FAMILY MEDICINE | Facility: CLINIC | Age: 73
End: 2023-04-21
Payer: MEDICARE

## 2023-04-21 VITALS
OXYGEN SATURATION: 96 % | SYSTOLIC BLOOD PRESSURE: 110 MMHG | BODY MASS INDEX: 42.33 KG/M2 | DIASTOLIC BLOOD PRESSURE: 70 MMHG | WEIGHT: 230 LBS | TEMPERATURE: 97.9 F | RESPIRATION RATE: 14 BRPM | HEIGHT: 62 IN | HEART RATE: 94 BPM

## 2023-04-21 DIAGNOSIS — Z87.898 PERSONAL HISTORY OF OTHER SPECIFIED CONDITIONS: ICD-10-CM

## 2023-04-21 DIAGNOSIS — R31.29 OTHER MICROSCOPIC HEMATURIA: ICD-10-CM

## 2023-04-21 DIAGNOSIS — R53.83 OTHER FATIGUE: ICD-10-CM

## 2023-04-21 PROCEDURE — 99214 OFFICE O/P EST MOD 30 MIN: CPT | Mod: 25

## 2023-04-21 PROCEDURE — 36415 COLL VENOUS BLD VENIPUNCTURE: CPT

## 2023-04-25 LAB
ALBUMIN SERPL ELPH-MCNC: 4.1 G/DL
ALP BLD-CCNC: 120 U/L
ALT SERPL-CCNC: 47 U/L
ANION GAP SERPL CALC-SCNC: 12 MMOL/L
APPEARANCE: CLEAR
AST SERPL-CCNC: 41 U/L
BACTERIA: NEGATIVE /HPF
BASOPHILS # BLD AUTO: 0.02 K/UL
BASOPHILS NFR BLD AUTO: 0.2 %
BILIRUB SERPL-MCNC: 0.2 MG/DL
BILIRUBIN URINE: NEGATIVE
BLOOD URINE: ABNORMAL
BUN SERPL-MCNC: 22 MG/DL
CALCIUM SERPL-MCNC: 9.2 MG/DL
CAST: 0 /LPF
CHLORIDE SERPL-SCNC: 107 MMOL/L
CO2 SERPL-SCNC: 26 MMOL/L
COLOR: YELLOW
CREAT SERPL-MCNC: 0.66 MG/DL
EGFR: 94 ML/MIN/1.73M2
EOSINOPHIL # BLD AUTO: 0.06 K/UL
EOSINOPHIL NFR BLD AUTO: 0.7 %
EPITHELIAL CELLS: 2 /HPF
ESTIMATED AVERAGE GLUCOSE: 126 MG/DL
FERRITIN SERPL-MCNC: 107 NG/ML
FOLATE SERPL-MCNC: 16 NG/ML
GLUCOSE QUALITATIVE U: NEGATIVE MG/DL
GLUCOSE SERPL-MCNC: 92 MG/DL
HBA1C MFR BLD HPLC: 6 %
HCT VFR BLD CALC: 38 %
HGB BLD-MCNC: 11.5 G/DL
IMM GRANULOCYTES NFR BLD AUTO: 0.3 %
IRON SATN MFR SERPL: 15 %
IRON SERPL-MCNC: 48 UG/DL
KETONES URINE: NEGATIVE MG/DL
LEUKOCYTE ESTERASE URINE: NEGATIVE
LYMPHOCYTES # BLD AUTO: 1.66 K/UL
LYMPHOCYTES NFR BLD AUTO: 18.9 %
MAGNESIUM SERPL-MCNC: 1.9 MG/DL
MAN DIFF?: NORMAL
MCHC RBC-ENTMCNC: 26.4 PG
MCHC RBC-ENTMCNC: 30.3 GM/DL
MCV RBC AUTO: 87.4 FL
MICROSCOPIC-UA: NORMAL
MONOCYTES # BLD AUTO: 0.65 K/UL
MONOCYTES NFR BLD AUTO: 7.4 %
NEUTROPHILS # BLD AUTO: 6.35 K/UL
NEUTROPHILS NFR BLD AUTO: 72.5 %
NITRITE URINE: NEGATIVE
PH URINE: 6.5
PHOSPHATE SERPL-MCNC: 3 MG/DL
PLATELET # BLD AUTO: 232 K/UL
POTASSIUM SERPL-SCNC: 3.9 MMOL/L
PROT SERPL-MCNC: 6 G/DL
PROTEIN URINE: NEGATIVE MG/DL
RBC # BLD: 4.35 M/UL
RBC # FLD: 15.1 %
RED BLOOD CELLS URINE: 1 /HPF
SODIUM SERPL-SCNC: 145 MMOL/L
SPECIFIC GRAVITY URINE: 1.02
T3FREE SERPL-MCNC: 2.59 PG/ML
T4 FREE SERPL-MCNC: 1.4 NG/DL
T4 SERPL-MCNC: 7.4 UG/DL
TIBC SERPL-MCNC: 323 UG/DL
TSH SERPL-ACNC: 0.7 UIU/ML
UIBC SERPL-MCNC: 275 UG/DL
UROBILINOGEN URINE: 1 MG/DL
VIT B12 SERPL-MCNC: 702 PG/ML
WBC # FLD AUTO: 8.77 K/UL
WHITE BLOOD CELLS URINE: 1 /HPF

## 2023-05-01 NOTE — PLAN
[FreeTextEntry1] : \par \par #Chronic pain of left lower extremity\par -US duplex arterial and venous\par -Vascular surgery evaluation\par \par #Fatigue\par -B12, folate, CBC, CMP, Ferritin, iron + TIBC, free T3, free T4, serum T4, TSH \par \par #HFrEF\par #Prediabetes\par -Nutritionist \par -A1C\par -Low sugar and low carbohydrate diet recommended \par \par #Afib\par -On metoprolol, entresto, furosemide and Xarelto\par \par #Microscopic hematuria\par -UA + microscopy \par \par #Left foot paresthesia\par -Mag and Phos\par \par #Vitamin D deficiency\par -Vitamin D3 50,000 units q/week x 6 weeks \par \par Labs drawn in office\par \par F/u in 1 month

## 2023-05-01 NOTE — REVIEW OF SYSTEMS
[Fatigue] : fatigue [Recent Change In Weight] : ~T recent weight change [Muscle Pain] : muscle pain [Negative] : Heme/Lymph [Fever] : no fever [Chills] : no chills [Hot Flashes] : no hot flashes [Night Sweats] : no night sweats [FreeTextEntry2] : not eating as much due to leg swelling with eating [FreeTextEntry9] : left leg pain

## 2023-05-01 NOTE — HISTORY OF PRESENT ILLNESS
[Family Member] : family member [FreeTextEntry1] : HTN\par left leg pain\par leg swelling [de-identified] : 73 yo F with hx of Afib (on Xarelto), HFrEF, HTN and CAD/cardiomyopathy presents for follow up HTN.\par \par Patient's daughter present on the phone states she is concerned about patient's inconsistent eating habits.\par Pt states her appetite has decreased and she is unsure of what to eat. She states that most foods cause her legs to swell.\par She also c/o fatigue and left leg pain

## 2023-05-01 NOTE — ASSESSMENT
[FreeTextEntry1] : 73 yo F with hx of Afib (on Xarelto), HFrEF, HTN and CAD/cardiomyopathy presents for follow up HTN.

## 2023-05-06 ENCOUNTER — APPOINTMENT (OUTPATIENT)
Dept: ULTRASOUND IMAGING | Facility: CLINIC | Age: 73
End: 2023-05-06

## 2023-05-19 ENCOUNTER — APPOINTMENT (OUTPATIENT)
Dept: FAMILY MEDICINE | Facility: CLINIC | Age: 73
End: 2023-05-19
Payer: MEDICARE

## 2023-05-19 VITALS
WEIGHT: 228 LBS | BODY MASS INDEX: 41.96 KG/M2 | RESPIRATION RATE: 14 BRPM | OXYGEN SATURATION: 97 % | TEMPERATURE: 98.1 F | HEART RATE: 82 BPM | HEIGHT: 62 IN | SYSTOLIC BLOOD PRESSURE: 111 MMHG | DIASTOLIC BLOOD PRESSURE: 72 MMHG

## 2023-05-19 DIAGNOSIS — R42 DIZZINESS AND GIDDINESS: ICD-10-CM

## 2023-05-19 DIAGNOSIS — Z87.898 PERSONAL HISTORY OF OTHER SPECIFIED CONDITIONS: ICD-10-CM

## 2023-05-19 DIAGNOSIS — M17.9 OSTEOARTHRITIS OF KNEE, UNSPECIFIED: ICD-10-CM

## 2023-05-19 DIAGNOSIS — R60.9 EDEMA, UNSPECIFIED: ICD-10-CM

## 2023-05-19 DIAGNOSIS — R20.2 PARESTHESIA OF SKIN: ICD-10-CM

## 2023-05-19 PROCEDURE — 99213 OFFICE O/P EST LOW 20 MIN: CPT

## 2023-05-19 NOTE — PLAN
[FreeTextEntry1] : \par #Left knee pain\par -Xray left knee\par -Rest, Ice, Compression, Elevation\par -Orthopaedic evaluation\par -Voltaren gel \par \par #Chronic pain of left lower extremity\par -US duplex venous- no DVT\par -US arterial LLE- calcified atherosclerotic plaque in distal left common femoral artery with luminal narrowing approximately 60%\par -Vascular surgery evaluation\par \par F/u in 2 months

## 2023-05-19 NOTE — PHYSICAL EXAM
[No Acute Distress] : no acute distress [Well Developed] : well developed [Well-Appearing] : well-appearing [Normal Sclera/Conjunctiva] : normal sclera/conjunctiva [EOMI] : extraocular movements intact [Supple] : supple [No Respiratory Distress] : no respiratory distress  [No Accessory Muscle Use] : no accessory muscle use [Clear to Auscultation] : lungs were clear to auscultation bilaterally [Normal Rate] : normal rate  [Regular Rhythm] : with a regular rhythm [Normal S1, S2] : normal S1 and S2 [Soft] : abdomen soft [Non Tender] : non-tender [Non-distended] : non-distended [No Focal Deficits] : no focal deficits [Speech Grossly Normal] : speech grossly normal [Normal Affect] : the affect was normal [Normal Mood] : the mood was normal [Normal Insight/Judgement] : insight and judgment were intact [de-identified] : left leg 1-2 + pitting edema, b/l varicosities of legs, swelling of left knee and tenderness with palpation anteriorly, no laxity with valgus or varus stress [de-identified] : crepitus of b/l knees with flexion and extension [de-identified] : bruising of medial aspect of left knee

## 2023-05-19 NOTE — ASSESSMENT
[FreeTextEntry1] : 74 yo F with hx of Afib (on Xarelto), HFrEF, HTN and CAD/cardiomyopathy presents for follow up and with c/o left knee pain.

## 2023-05-19 NOTE — REVIEW OF SYSTEMS
[Negative] : Heme/Lymph [Muscle Weakness] : no muscle weakness [Back Pain] : no back pain [FreeTextEntry9] : left knee pain and swelling

## 2023-05-19 NOTE — HISTORY OF PRESENT ILLNESS
[FreeTextEntry1] : left knee pain [de-identified] : 72 yo F with hx of Afib (on Xarelto), HFrEF, HTN and CAD/cardiomyopathy presents for follow up and with c/o left knee pain.\par \par Patient states her knee dislocated when walking down the stairs. She states it popped back into place and left a bruise.\par She states pain is 6/10 and she has been taking Ibuprofen.

## 2023-06-01 ENCOUNTER — APPOINTMENT (OUTPATIENT)
Dept: ORTHOPEDIC SURGERY | Facility: CLINIC | Age: 73
End: 2023-06-01
Payer: MEDICARE

## 2023-06-01 PROCEDURE — 20610 DRAIN/INJ JOINT/BURSA W/O US: CPT | Mod: 50

## 2023-06-01 PROCEDURE — 99215 OFFICE O/P EST HI 40 MIN: CPT | Mod: 25

## 2023-06-01 NOTE — PROCEDURE
[de-identified] : Patient was given a cortisone injection (Lidocaine 1% 4 cc + 10 mg of Kenalog) in the lateral compartment of the right and left knee. Injection is performed under sterile conditions with ultrasound guidance. Patient tolerated procedure well. If patient has a history of insulin- dependant diabetes they were informed of possible increase of blood glucose levels and instructed to adjust insulin accordingly.\par \par LIDOCAINE\par HIKMA [employer]ACE[employer]\par NDC 5072-5620-13\par LOT [id number]\par EXP 10/23\par 500MG/50ML\par \par KENALOG\par Biotectix\par NDC 7969-6083-77\par LOT [id number]\par EXP 10/23\par 50MG/5ML\par \par

## 2023-06-01 NOTE — PHYSICAL EXAM
[de-identified] : Left knee on exam there is warmth soft tissue is noted with small effusion mild valgus inclination.  Range of motion 0 to 110 degrees.  Knee is stable to varus valgus stress in both to full extension and 90 degrees of flexion.\par \par Similar findings to a lesser degree on the right knee.  There is definite warmth and soft tissue swelling lateral joint line tenderness is noted mild valgus ablation range of motion 0 to 125 degrees the knee is stable to AP stress varus valgus stress in both full extension and 9 degrees of flexion. [de-identified] : Patient presents with outside radiographs of the left knee AP lateral and sunrise views which show severe arthritis of patellofemoral articulation as well as arthritis of both the medial and lateral joint line.

## 2023-06-01 NOTE — DISCUSSION/SUMMARY
[Surgical risks reviewed] : Surgical risks reviewed [de-identified] : Patient I discussed at length the underlying etiology of her bilateral knee pain.  Patient has severe osteoarthritis verified by radiographs on the left knee it is very likely patient has moderate osteoarthritis in the right knee based on clinical exam without the radiographs as well.  Patient given bilateral cortisone injections today.  She was told at length about the potential need to consider knee replacement surgery if she sustained symptomatic improvement certainly measures.  We touched on the reasonable risk benefits of the operation as well as typical patient will follow-up as needed.\par \par This consultation lasted 40 minutes

## 2023-06-01 NOTE — HISTORY OF PRESENT ILLNESS
[de-identified] : First-time visit for this patient she is here with complaint of chronic bilateral knee pain left greater than right.  Patient point she has difficulty with stair climbing she has difficulty getting out of a seated position a prolonged standing is also difficult for her.  Patient is a blood thinner

## 2023-07-10 ENCOUNTER — APPOINTMENT (OUTPATIENT)
Dept: FAMILY MEDICINE | Facility: CLINIC | Age: 73
End: 2023-07-10
Payer: MEDICARE

## 2023-07-10 VITALS
RESPIRATION RATE: 14 BRPM | TEMPERATURE: 97.6 F | BODY MASS INDEX: 41.41 KG/M2 | WEIGHT: 225 LBS | HEIGHT: 62 IN | DIASTOLIC BLOOD PRESSURE: 67 MMHG | SYSTOLIC BLOOD PRESSURE: 104 MMHG | OXYGEN SATURATION: 98 % | HEART RATE: 71 BPM

## 2023-07-10 DIAGNOSIS — R79.89 OTHER SPECIFIED ABNORMAL FINDINGS OF BLOOD CHEMISTRY: ICD-10-CM

## 2023-07-10 DIAGNOSIS — Z86.018 PERSONAL HISTORY OF OTHER BENIGN NEOPLASM: ICD-10-CM

## 2023-07-10 DIAGNOSIS — Z12.11 ENCOUNTER FOR SCREENING FOR MALIGNANT NEOPLASM OF COLON: ICD-10-CM

## 2023-07-10 DIAGNOSIS — Z87.898 PERSONAL HISTORY OF OTHER SPECIFIED CONDITIONS: ICD-10-CM

## 2023-07-10 DIAGNOSIS — M25.562 PAIN IN LEFT KNEE: ICD-10-CM

## 2023-07-10 DIAGNOSIS — M17.0 BILATERAL PRIMARY OSTEOARTHRITIS OF KNEE: ICD-10-CM

## 2023-07-10 DIAGNOSIS — R07.89 OTHER CHEST PAIN: ICD-10-CM

## 2023-07-10 DIAGNOSIS — M79.605 PAIN IN LEFT LEG: ICD-10-CM

## 2023-07-10 DIAGNOSIS — G89.29 PAIN IN LEFT LEG: ICD-10-CM

## 2023-07-10 PROCEDURE — 99214 OFFICE O/P EST MOD 30 MIN: CPT | Mod: 25

## 2023-07-10 PROCEDURE — 36415 COLL VENOUS BLD VENIPUNCTURE: CPT

## 2023-07-10 NOTE — HISTORY OF PRESENT ILLNESS
[FreeTextEntry1] : f/u left knee pain and leg pain\par follow up prediabetes [de-identified] : 72 yo F with hx of Afib (on Xarelto), HFrEF, HTN and CAD/cardiomyopathy presents for f/u of knee pain, left leg pain and prediabetes.\par \par Patient states knee pain improved after steroid shot of b/l knee. She reports trying to cut down on carbohydrates and sugar.\par \par She reports last physical was in 2022, date unknown.\par Cancer screening:\par Last mammogram 2023- normal\par DEXA scan 2023-normal\par Colon cancer screening- stool test about 20 years ago\par Pap smear- 2002, hx of hysterectomy (due to uterine fibroids)\par \par Former smoker- 1 PPD x 30 years, quit more than 15 years ago

## 2023-07-10 NOTE — ASSESSMENT
[FreeTextEntry1] : 74 yo F with hx of Afib (on Xarelto), HFrEF, HTN and CAD/cardiomyopathy presents for f/u of knee pain, left leg pain and prediabetes.

## 2023-07-10 NOTE — PHYSICAL EXAM
[No Acute Distress] : no acute distress [Well Developed] : well developed [Well-Appearing] : well-appearing [Normal Sclera/Conjunctiva] : normal sclera/conjunctiva [EOMI] : extraocular movements intact [Supple] : supple [No Respiratory Distress] : no respiratory distress  [No Accessory Muscle Use] : no accessory muscle use [Clear to Auscultation] : lungs were clear to auscultation bilaterally [Normal Rate] : normal rate  [Regular Rhythm] : with a regular rhythm [Normal S1, S2] : normal S1 and S2 [Soft] : abdomen soft [Non Tender] : non-tender [Non-distended] : non-distended [No Masses] : no abdominal mass palpated [Grossly Normal Strength/Tone] : grossly normal strength/tone [No Rash] : no rash [No Focal Deficits] : no focal deficits [Normal Affect] : the affect was normal [Normal Insight/Judgement] : insight and judgment were intact [de-identified] : peripheral edema of left lower extremity

## 2023-07-10 NOTE — PLAN
[FreeTextEntry1] : \par \par #Left knee pain\par -Improved with steroid injection\par \par #Prediabetes\par -A1C - 6.0 % (04/2023)\par -Repeat A1C today\par -Given risk of elevated glucose with steroid injections, recommended patient start Metformin\par -Metformin 500 mg QD- risk of lactic acidosis, kidney impairment and GI side effects discussed\par \par #Chronic pain of left lower extremity\par -US duplex venous- no DVT\par -US arterial LLE- calcified atherosclerotic plaque in distal left common femoral artery with luminal narrowing approximately 60%\par -Vascular evaluation encouraged \par \par #Hx of elevated LFTs\par -CMP ordered\par \par #HFrEF\par #HTN\par #Afib\par -Stable\par \par #HM\par -Colon cancer screening recommended\par \par F/u in 3 months/when CPE due\par \par Labs drawn in office

## 2023-07-11 LAB
ESTIMATED AVERAGE GLUCOSE: 126 MG/DL
HBA1C MFR BLD HPLC: 6 %

## 2023-07-17 LAB
ALBUMIN SERPL ELPH-MCNC: 3.8 G/DL
ALP BLD-CCNC: 77 U/L
ALT SERPL-CCNC: 27 U/L
ANION GAP SERPL CALC-SCNC: 10 MMOL/L
AST SERPL-CCNC: 28 U/L
BILIRUB SERPL-MCNC: 0.4 MG/DL
BUN SERPL-MCNC: 12 MG/DL
CALCIUM SERPL-MCNC: 9.5 MG/DL
CHLORIDE SERPL-SCNC: 108 MMOL/L
CO2 SERPL-SCNC: 27 MMOL/L
CREAT SERPL-MCNC: 0.69 MG/DL
EGFR: 92 ML/MIN/1.73M2
GLUCOSE SERPL-MCNC: 85 MG/DL
POTASSIUM SERPL-SCNC: 4 MMOL/L
PROT SERPL-MCNC: 5.9 G/DL
SODIUM SERPL-SCNC: 146 MMOL/L

## 2023-07-24 ENCOUNTER — RX RENEWAL (OUTPATIENT)
Age: 73
End: 2023-07-24

## 2023-08-03 ENCOUNTER — APPOINTMENT (OUTPATIENT)
Dept: PULMONOLOGY | Facility: CLINIC | Age: 73
End: 2023-08-03

## 2023-08-07 ENCOUNTER — APPOINTMENT (OUTPATIENT)
Dept: VASCULAR SURGERY | Facility: CLINIC | Age: 73
End: 2023-08-07
Payer: MEDICARE

## 2023-08-07 VITALS
WEIGHT: 230 LBS | HEART RATE: 73 BPM | BODY MASS INDEX: 36.96 KG/M2 | SYSTOLIC BLOOD PRESSURE: 114 MMHG | DIASTOLIC BLOOD PRESSURE: 71 MMHG | TEMPERATURE: 97.4 F | HEIGHT: 66 IN

## 2023-08-07 PROCEDURE — 99204 OFFICE O/P NEW MOD 45 MIN: CPT

## 2023-08-07 PROCEDURE — 93970 EXTREMITY STUDY: CPT

## 2023-08-07 PROCEDURE — 93923 UPR/LXTR ART STDY 3+ LVLS: CPT

## 2023-08-07 PROCEDURE — ZZZZZ: CPT

## 2023-08-07 NOTE — DATA REVIEWED
[FreeTextEntry1] : 8/7/2023 Venous Doppler Ivan LE  no acute dvt svt                             RLE GSV no sono evidence of reflux                            LLE GSV no sono evidence of reflux                                      8/7/2023 JACK/PVR RLE mild infra inguinal and LLE mild infra inguinal arterial insuff                                   w vessel calcification                                  Rt JACK  1.41 Lt JACK  1.39

## 2023-08-07 NOTE — ASSESSMENT
[FreeTextEntry1] : Impression symptomatic arterial insuff , symptomatic venous insuff not yet sono evident,   Plan Med Conservative management leg elevation, knee high compression stockings 15-20mm Hg (rx given), wt loss, diet control, exercise program, protective measures d/w pt trial pletal  pt wants to hold off and be reeval will need le art insuff surveillance ov w  oral/pvr s/o art insuff 12 mo   aug 2024  telephonic visit in 2-3 mo to re eval     Varicose veins are enlarged, twisted veins. Varicose veins are caused by increased blood pressure in the veins.  The blood moves towards the heart by 1-way valves in the veins. When the valves become weakened or damaged, blood can collect in the veins and pool in your lower legs (ankles). This causes the veins to become enlarged and incompetent with reflux. Sitting or standing for long periods can cause blood to pool in the leg veins, increasing the pressure within the veins.  Risk factors for varicose veins or venous disease may include:  obesity, older age, standing or sitting for prolonged periods of time for several years, being female, pregnancy, taking oral contraceptive pills or hormone replacement, being inactive, and/or smoking.  The most common symptoms of varicose veins are sensations in the legs, such as a heavy feeling, burning, and/or aching. However, each individual may experience symptoms differently.  Other symptoms may include:  color changes in the skin, sores on the legs, or rash.  Severe varicose veins or venous disease may eventually produce long-term mild swelling that can result in more serious skin and tissue problems, such as ulcers and non-healing sores. Varicose veins and venous disease are diagnosed by a complete medical history, physical examination, and diagnostic studies for varicose veins including duplex ultrasound and color-flow imaging.   Medical treatment for varicose veins and venous disease include:  compression stockings, sclerotherapy, endovenous ablation and/or surgical treatment with microphlebectomy.     [Arterial/Venous Disease] : arterial/venous disease [Other: _____] : [unfilled]

## 2023-08-07 NOTE — HISTORY OF PRESENT ILLNESS
Recommend taking ibuprofen 400 mg 3 times daily and doing warm compresses at the injection site.   [FreeTextEntry1] : Pt c/o  left swelling heaviness worse  before going to sleep  Pt denies recent injury, travel or thrombophilic event  Pt c/o  clarice left  leg and foot nocturnal cramps intermittently  and all the time  Onset 1-3  mo ago  Intensity severe Pt denies recent injury, travel  Pt denies any recent  cardiac c/o , SOB, Chest Pain or recent heart attacks

## 2023-08-07 NOTE — PHYSICAL EXAM
[Normal Breath Sounds] : Normal breath sounds [Alert] : alert [Oriented to Person] : oriented to person [Oriented to Place] : oriented to place [Oriented to Time] : oriented to time [Calm] : calm [2+] : left 2+ [1+] : left 1+ [Ankle Swelling (On Exam)] : present [Ankle Swelling Bilaterally] : bilaterally  [Varicose Veins Of Lower Extremities] : bilaterally [] : bilaterally [Ankle Swelling On The Right] : mild [JVD] : no jugular venous distention  [Right Carotid Bruit] : no bruit heard over the right carotid [Left Carotid Bruit] : no bruit heard over the left carotid [de-identified] : nad [de-identified] : wnl [FreeTextEntry1] : Mild bilateral leg venous insufficiency  w mild bilateral leg stasis dermatitis,flaky skin and mild bilateral leg edema  Multiple  bilateral leg small varicose  veins and spider veins  ant post medial calf and shin  RLE Varicose veins measuring  1-2 mm in size on the calf /shin  LLE Varicose veins measuring  1-2 mm in size on the calf /shin  no wounds/ulcers mild art insuff w mild trophic skin changes    [de-identified] : wnl [de-identified] : Ivan Cranial nerves 2-12 ivan grossly intact [de-identified] : cooperative

## 2023-08-14 ENCOUNTER — APPOINTMENT (OUTPATIENT)
Dept: VASCULAR SURGERY | Facility: CLINIC | Age: 73
End: 2023-08-14

## 2023-10-05 ENCOUNTER — RX RENEWAL (OUTPATIENT)
Age: 73
End: 2023-10-05

## 2023-10-10 ENCOUNTER — APPOINTMENT (OUTPATIENT)
Dept: FAMILY MEDICINE | Facility: CLINIC | Age: 73
End: 2023-10-10
Payer: MEDICARE

## 2023-10-10 VITALS
HEIGHT: 66 IN | DIASTOLIC BLOOD PRESSURE: 73 MMHG | OXYGEN SATURATION: 99 % | TEMPERATURE: 97 F | WEIGHT: 215 LBS | SYSTOLIC BLOOD PRESSURE: 108 MMHG | RESPIRATION RATE: 16 BRPM | HEART RATE: 85 BPM | BODY MASS INDEX: 34.55 KG/M2

## 2023-10-10 DIAGNOSIS — E66.9 OBESITY, UNSPECIFIED: ICD-10-CM

## 2023-10-10 DIAGNOSIS — E66.09 OTHER OBESITY DUE TO EXCESS CALORIES: ICD-10-CM

## 2023-10-10 DIAGNOSIS — Z23 ENCOUNTER FOR IMMUNIZATION: ICD-10-CM

## 2023-10-10 DIAGNOSIS — I70.209 UNSPECIFIED ATHEROSCLEROSIS OF NATIVE ARTERIES OF EXTREMITIES, UNSPECIFIED EXTREMITY: ICD-10-CM

## 2023-10-10 PROCEDURE — 90471 IMMUNIZATION ADMIN: CPT

## 2023-10-10 PROCEDURE — 99214 OFFICE O/P EST MOD 30 MIN: CPT | Mod: 25

## 2023-10-10 PROCEDURE — 36415 COLL VENOUS BLD VENIPUNCTURE: CPT

## 2023-10-10 PROCEDURE — 90686 IIV4 VACC NO PRSV 0.5 ML IM: CPT

## 2023-10-11 ENCOUNTER — APPOINTMENT (OUTPATIENT)
Dept: VASCULAR SURGERY | Facility: CLINIC | Age: 73
End: 2023-10-11
Payer: MEDICARE

## 2023-10-11 DIAGNOSIS — G47.62 SLEEP RELATED LEG CRAMPS: ICD-10-CM

## 2023-10-11 DIAGNOSIS — R25.2 CRAMP AND SPASM: ICD-10-CM

## 2023-10-11 DIAGNOSIS — I87.2 VENOUS INSUFFICIENCY (CHRONIC) (PERIPHERAL): ICD-10-CM

## 2023-10-11 PROBLEM — I70.209: Status: ACTIVE | Noted: 2023-05-19

## 2023-10-11 PROBLEM — E66.9 OBESITY: Status: ACTIVE | Noted: 2023-04-21

## 2023-10-11 PROCEDURE — 99442: CPT

## 2023-10-13 LAB
ALBUMIN SERPL ELPH-MCNC: 3.9 G/DL
ALP BLD-CCNC: 75 U/L
ALT SERPL-CCNC: 15 U/L
ANION GAP SERPL CALC-SCNC: 16 MMOL/L
AST SERPL-CCNC: 23 U/L
BASOPHILS # BLD AUTO: 0.03 K/UL
BASOPHILS NFR BLD AUTO: 0.4 %
BILIRUB SERPL-MCNC: 0.2 MG/DL
BUN SERPL-MCNC: 21 MG/DL
CALCIUM SERPL-MCNC: 9.3 MG/DL
CHLORIDE SERPL-SCNC: 107 MMOL/L
CHOLEST SERPL-MCNC: 141 MG/DL
CO2 SERPL-SCNC: 21 MMOL/L
CREAT SERPL-MCNC: 0.61 MG/DL
EGFR: 95 ML/MIN/1.73M2
EOSINOPHIL # BLD AUTO: 0.06 K/UL
EOSINOPHIL NFR BLD AUTO: 0.8 %
ESTIMATED AVERAGE GLUCOSE: 126 MG/DL
GLUCOSE SERPL-MCNC: 90 MG/DL
HBA1C MFR BLD HPLC: 6 %
HCT VFR BLD CALC: 37.8 %
HDLC SERPL-MCNC: 59 MG/DL
HGB BLD-MCNC: 11.5 G/DL
IMM GRANULOCYTES NFR BLD AUTO: 0.3 %
LDLC SERPL CALC-MCNC: 70 MG/DL
LYMPHOCYTES # BLD AUTO: 1.53 K/UL
LYMPHOCYTES NFR BLD AUTO: 21.2 %
MAN DIFF?: NORMAL
MCHC RBC-ENTMCNC: 26.8 PG
MCHC RBC-ENTMCNC: 30.4 GM/DL
MCV RBC AUTO: 88.1 FL
MONOCYTES # BLD AUTO: 0.53 K/UL
MONOCYTES NFR BLD AUTO: 7.3 %
NEUTROPHILS # BLD AUTO: 5.05 K/UL
NEUTROPHILS NFR BLD AUTO: 70 %
NONHDLC SERPL-MCNC: 82 MG/DL
PLATELET # BLD AUTO: 204 K/UL
POTASSIUM SERPL-SCNC: 4.8 MMOL/L
PROT SERPL-MCNC: 6 G/DL
RBC # BLD: 4.29 M/UL
RBC # FLD: 15 %
SODIUM SERPL-SCNC: 144 MMOL/L
TRIGL SERPL-MCNC: 58 MG/DL
TSH SERPL-ACNC: 0.85 UIU/ML
VIT B12 SERPL-MCNC: 665 PG/ML
WBC # FLD AUTO: 7.22 K/UL

## 2023-10-19 LAB — 25(OH)D3 SERPL-MCNC: 23.6 NG/ML

## 2023-11-27 ENCOUNTER — RX RENEWAL (OUTPATIENT)
Age: 73
End: 2023-11-27

## 2024-01-02 ENCOUNTER — RX RENEWAL (OUTPATIENT)
Age: 74
End: 2024-01-02

## 2024-01-05 ENCOUNTER — RX RENEWAL (OUTPATIENT)
Age: 74
End: 2024-01-05

## 2024-01-31 ENCOUNTER — APPOINTMENT (OUTPATIENT)
Dept: VASCULAR SURGERY | Facility: CLINIC | Age: 74
End: 2024-01-31
Payer: MEDICARE

## 2024-01-31 DIAGNOSIS — I83.893 VARICOSE VEINS OF BILATERAL LOWER EXTREMITIES WITH OTHER COMPLICATIONS: ICD-10-CM

## 2024-01-31 PROCEDURE — 99441: CPT

## 2024-01-31 NOTE — PHYSICAL EXAM
[Alert] : alert [Oriented to Person] : oriented to person [Oriented to Place] : oriented to place [Oriented to Time] : oriented to time [Calm] : calm [de-identified] : no resp effort  [FreeTextEntry1] : Physical exam findings via telephonic review with patient   [de-identified] : cooperative

## 2024-01-31 NOTE — REASON FOR VISIT
[Home] : at home, [unfilled] , at the time of the visit. [Medical Office: ([facility])___] : at the medical office located in  [Other:____] : [unfilled] [Verbal consent obtained from patient] : the patient, [unfilled] [FreeTextEntry1] : My legs bother me  less

## 2024-01-31 NOTE — ASSESSMENT
[FreeTextEntry1] : Impression symptomatic arterial insuff  and symptomatic venous insuff  w clinical improvment    Plan Med Conservative management leg elevation, knee high compression stockings 15-20mm Hg, wt loss, diet control, exercise program, protective measures d/w pt trial pletal pt wants to hold off and be re eval will need le art insuff surveillance ov w oral/pvr s/o art insuff 12 mo aug 2024 Telephonic visit  time duration 7  min    Varicose veins are enlarged, twisted veins. Varicose veins are caused by increased blood pressure in the veins. The blood moves towards the heart by 1-way valves in the veins. When the valves become weakened or damaged, blood can collect in the veins and pool in your lower legs (ankles). This causes the veins to become enlarged and incompetent with reflux. Sitting or standing for long periods can cause blood to pool in the leg veins, increasing the pressure within the veins. Risk factors for varicose veins or venous disease may include: obesity, older age, standing or sitting for prolonged periods of time for several years, being female, pregnancy, taking oral contraceptive pills or hormone replacement, being inactive, and/or smoking. The most common symptoms of varicose veins are sensations in the legs, such as a heavy feeling, burning, and/or aching. However, each individual may experience symptoms differently. Other symptoms may include: color changes in the skin, sores on the legs, or rash. Severe varicose veins or venous disease may eventually produce long-term mild swelling that can result in more serious skin and tissue problems, such as ulcers and non-healing sores. Varicose veins and venous disease are diagnosed by a complete medical history, physical examination, and diagnostic studies for varicose veins including duplex ultrasound and color-flow imaging. Medical treatment for varicose veins and venous disease include: compression stockings, sclerotherapy, endovenous ablation and/or surgical treatment with microphlebectomy.      [Arterial/Venous Disease] : arterial/venous disease [Other: _____] : [un[facility]

## 2024-01-31 NOTE — HISTORY OF PRESENT ILLNESS
[FreeTextEntry1] : Pt c/o  left swelling heaviness worse  before going to sleep  Pt denies recent injury, travel or thrombophilic event  Pt c/o  clarice left  leg and foot nocturnal cramps intermittently  and all the time  Onset 1-3  mo ago  Intensity severe Pt denies recent injury, travel  Pt denies any recent  cardiac c/o , SOB, Chest Pain or recent heart attacks     [de-identified] : pt is compliant w comp stockings pt states le swelling and heaviness has improved pt states  warner leg and feet nocturnal cramps  have improved also overall feeling better  pt states no le wounds

## 2024-02-21 ENCOUNTER — NON-APPOINTMENT (OUTPATIENT)
Age: 74
End: 2024-02-21

## 2024-02-21 ENCOUNTER — APPOINTMENT (OUTPATIENT)
Dept: FAMILY MEDICINE | Facility: CLINIC | Age: 74
End: 2024-02-21
Payer: MEDICARE

## 2024-02-21 VITALS
WEIGHT: 205 LBS | DIASTOLIC BLOOD PRESSURE: 68 MMHG | BODY MASS INDEX: 32.95 KG/M2 | OXYGEN SATURATION: 98 % | RESPIRATION RATE: 16 BRPM | SYSTOLIC BLOOD PRESSURE: 112 MMHG | HEIGHT: 66 IN | TEMPERATURE: 97.1 F | HEART RATE: 85 BPM

## 2024-02-21 DIAGNOSIS — Z00.00 ENCOUNTER FOR GENERAL ADULT MEDICAL EXAMINATION W/OUT ABNORMAL FINDINGS: ICD-10-CM

## 2024-02-21 DIAGNOSIS — R92.30 DENSE BREASTS, UNSPECIFIED: ICD-10-CM

## 2024-02-21 DIAGNOSIS — I73.9 PERIPHERAL VASCULAR DISEASE, UNSPECIFIED: ICD-10-CM

## 2024-02-21 DIAGNOSIS — I48.91 UNSPECIFIED ATRIAL FIBRILLATION: ICD-10-CM

## 2024-02-21 DIAGNOSIS — R73.03 PREDIABETES.: ICD-10-CM

## 2024-02-21 DIAGNOSIS — R92.1 MAMMOGRAPHIC CALCIFICATION FOUND ON DIAGNOSTIC IMAGING OF BREAST: ICD-10-CM

## 2024-02-21 PROCEDURE — 36415 COLL VENOUS BLD VENIPUNCTURE: CPT

## 2024-02-21 PROCEDURE — 93000 ELECTROCARDIOGRAM COMPLETE: CPT

## 2024-02-21 PROCEDURE — G0439: CPT

## 2024-02-27 LAB
ALBUMIN SERPL ELPH-MCNC: 3.9 G/DL
ALP BLD-CCNC: 65 U/L
ALT SERPL-CCNC: 18 U/L
ANION GAP SERPL CALC-SCNC: 9 MMOL/L
APPEARANCE: CLEAR
AST SERPL-CCNC: 19 U/L
BACTERIA: NEGATIVE /HPF
BILIRUB SERPL-MCNC: 0.3 MG/DL
BILIRUBIN URINE: NEGATIVE
BLOOD URINE: NEGATIVE
BUN SERPL-MCNC: 14 MG/DL
CALCIUM SERPL-MCNC: 9.2 MG/DL
CAST: 5 /LPF
CHLORIDE SERPL-SCNC: 107 MMOL/L
CHOLEST SERPL-MCNC: 145 MG/DL
CO2 SERPL-SCNC: 28 MMOL/L
COLOR: YELLOW
CREAT SERPL-MCNC: 0.79 MG/DL
EGFR: 79 ML/MIN/1.73M2
EPITHELIAL CELLS: 3 /HPF
ESTIMATED AVERAGE GLUCOSE: 120 MG/DL
GLUCOSE QUALITATIVE U: NEGATIVE MG/DL
GLUCOSE SERPL-MCNC: 88 MG/DL
HBA1C MFR BLD HPLC: 5.8 %
HDLC SERPL-MCNC: 56 MG/DL
KETONES URINE: ABNORMAL MG/DL
LDLC SERPL CALC-MCNC: 77 MG/DL
LEUKOCYTE ESTERASE URINE: ABNORMAL
MICROSCOPIC-UA: NORMAL
NITRITE URINE: NEGATIVE
NONHDLC SERPL-MCNC: 89 MG/DL
PH URINE: 5.5
POTASSIUM SERPL-SCNC: 4.9 MMOL/L
PROT SERPL-MCNC: 6 G/DL
PROTEIN URINE: NEGATIVE MG/DL
RED BLOOD CELLS URINE: 5 /HPF
SODIUM SERPL-SCNC: 144 MMOL/L
SPECIFIC GRAVITY URINE: 1.02
TRIGL SERPL-MCNC: 58 MG/DL
TSH SERPL-ACNC: 1.4 UIU/ML
UROBILINOGEN URINE: 0.2 MG/DL
WHITE BLOOD CELLS URINE: 1 /HPF

## 2024-03-03 PROBLEM — I73.9 ARTERIAL INSUFFICIENCY OF LOWER EXTREMITY: Status: ACTIVE | Noted: 2023-08-07

## 2024-03-03 PROBLEM — R73.03 PREDIABETES: Status: ACTIVE | Noted: 2023-02-15

## 2024-03-03 PROBLEM — I48.91 ATRIAL FIBRILLATION: Status: ACTIVE | Noted: 2023-03-06

## 2024-03-03 NOTE — HEALTH RISK ASSESSMENT
[Very Good] : ~his/her~ current health as very good [Good] : ~his/her~  mood as  good [No] : In the past 12 months have you used drugs other than those required for medical reasons? No [No falls in past year] : Patient reported no falls in the past year [0] : 2) Feeling down, depressed, or hopeless: Not at all (0) [PHQ-2 Negative - No further assessment needed] : PHQ-2 Negative - No further assessment needed [Patient reported mammogram was normal] : Patient reported mammogram was normal [Patient reported bone density results were normal] : Patient reported bone density results were normal [None] : None [Alone] : lives alone [Retired] : retired [High School] : high school [] :  [Feels Safe at Home] : Feels safe at home [Fully functional (bathing, dressing, toileting, transferring, walking, feeding)] : Fully functional (bathing, dressing, toileting, transferring, walking, feeding) [Fully functional (using the telephone, shopping, preparing meals, housekeeping, doing laundry, using] : Fully functional and needs no help or supervision to perform IADLs (using the telephone, shopping, preparing meals, housekeeping, doing laundry, using transportation, managing medications and managing finances) [Smoke Detector] : smoke detector [Carbon Monoxide Detector] : carbon monoxide detector [Seat Belt] :  uses seat belt [Never] : Never [de-identified] : streching in morning times, lots of walking.  [de-identified] : poor, high carbs, high salt.  [Guns at Home] : no guns at home [Sexually Active] : not sexually active [MammogramDate] : 11/2023 [Sunscreen] : does not use sunscreen [BoneDensityDate] : 2023

## 2024-03-03 NOTE — HISTORY OF PRESENT ILLNESS
[FreeTextEntry1] : comprehensive physical examination [de-identified] : 74 y/o female presents for comprehensive physical examination. She denies any acute complaints today.

## 2024-03-03 NOTE — PHYSICAL EXAM
[Well Nourished] : well nourished [No Acute Distress] : no acute distress [Well Developed] : well developed [Well-Appearing] : well-appearing [Normal Sclera/Conjunctiva] : normal sclera/conjunctiva [PERRL] : pupils equal round and reactive to light [Normal Oropharynx] : the oropharynx was normal [EOMI] : extraocular movements intact [Normal Outer Ear/Nose] : the outer ears and nose were normal in appearance [Normal TMs] : both tympanic membranes were normal [No Lymphadenopathy] : no lymphadenopathy [No JVD] : no jugular venous distention [Thyroid Normal, No Nodules] : the thyroid was normal and there were no nodules present [Supple] : supple [No Respiratory Distress] : no respiratory distress  [No Accessory Muscle Use] : no accessory muscle use [Normal Rate] : normal rate  [Clear to Auscultation] : lungs were clear to auscultation bilaterally [Normal S1, S2] : normal S1 and S2 [No Murmur] : no murmur heard [No Abdominal Bruit] : a ~M bruit was not heard ~T in the abdomen [No Varicosities] : no varicosities [Pedal Pulses Present] : the pedal pulses are present [No Edema] : there was no peripheral edema [No Palpable Aorta] : no palpable aorta [Soft] : abdomen soft [No Extremity Clubbing/Cyanosis] : no extremity clubbing/cyanosis [Non Tender] : non-tender [Non-distended] : non-distended [No HSM] : no HSM [Normal Posterior Cervical Nodes] : no posterior cervical lymphadenopathy [Normal Anterior Cervical Nodes] : no anterior cervical lymphadenopathy [Normal Bowel Sounds] : normal bowel sounds [No CVA Tenderness] : no CVA  tenderness [No Joint Swelling] : no joint swelling [No Spinal Tenderness] : no spinal tenderness [Coordination Grossly Intact] : coordination grossly intact [Grossly Normal Strength/Tone] : grossly normal strength/tone [No Rash] : no rash [No Focal Deficits] : no focal deficits [Normal Affect] : the affect was normal [Normal Insight/Judgement] : insight and judgment were intact [Normal Appearance] : normal in appearance [No Masses] : no palpable masses [No Axillary Lymphadenopathy] : no axillary lymphadenopathy [No Nipple Discharge] : no nipple discharge [de-identified] : irregular rhythm [de-identified] : dense breasts

## 2024-03-03 NOTE — PLAN
[FreeTextEntry1] :  #HM -CMP, CBC w. diff, Lipid, A1C, TSH w. rFT4 -Flu shot annually recommended- 2023 -COVID-19 vaccination x 2 doses completed (moderna) -Tdap q10 years recommended -Shingrix vaccination -Use of sunscreen -Physical activity and healthy lifestyle recommended- diet low in processed foods and saturated fats and high in vegetables and whole grains -Dermatology evaluation for skin cancer screening -GI referral -GYN referral -Annual dental and vision exam recommended -US breasts  -EKG done today in office- Afib, low voltage  #Afib -Cardiology evaluation  Labs drawn in office

## 2024-03-04 ENCOUNTER — APPOINTMENT (OUTPATIENT)
Dept: HEART AND VASCULAR | Facility: CLINIC | Age: 74
End: 2024-03-04
Payer: MEDICARE

## 2024-03-04 VITALS
RESPIRATION RATE: 16 BRPM | DIASTOLIC BLOOD PRESSURE: 72 MMHG | WEIGHT: 205 LBS | OXYGEN SATURATION: 96 % | HEIGHT: 66 IN | SYSTOLIC BLOOD PRESSURE: 117 MMHG | HEART RATE: 94 BPM | BODY MASS INDEX: 32.95 KG/M2

## 2024-03-04 PROCEDURE — G2211 COMPLEX E/M VISIT ADD ON: CPT

## 2024-03-04 PROCEDURE — 99214 OFFICE O/P EST MOD 30 MIN: CPT

## 2024-03-04 NOTE — PHYSICAL EXAM
[Well Developed] : well developed [Well Nourished] : well nourished [No Acute Distress] : no acute distress [Normal Venous Pressure] : normal venous pressure [No Carotid Bruit] : no carotid bruit [Normal S1, S2] : normal S1, S2 [No Murmur] : no murmur [No Rub] : no rub [No Gallop] : no gallop [Clear Lung Fields] : clear lung fields [Good Air Entry] : good air entry [No Respiratory Distress] : no respiratory distress  [No Edema] : no edema [No Cyanosis] : no cyanosis [No Clubbing] : no clubbing [No Varicosities] : no varicosities [Moves all extremities] : moves all extremities [No Focal Deficits] : no focal deficits [Normal Speech] : normal speech [Alert and Oriented] : alert and oriented [Normal memory] : normal memory [de-identified] : Irreg irreg

## 2024-03-04 NOTE — ASSESSMENT
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  She feels well. She is currently compensated and euvolemic.  - I reviewed labs 2/21/24 - I reviewed ECG --> no significant change when compared with prior  - continue taking cardiac meds (entresto, metoprolol, lasix, atorvastatin, xarelto) - Nonobstructive coronary artery disease on CTA on 6/21/2021 - check proBNP - daily weight monitoring  - Increase ambulation as tolerated to aim for approximately 30 minutes of moderate activity 5 days a week.  Heart healthy diet low in sodium, sugars and saturated fats and high in fruits, vegetables and whole grains.  Weight loss.    RTO in 4 mo.

## 2024-03-04 NOTE — HISTORY OF PRESENT ILLNESS
[FreeTextEntry1] : HELEN VALENTIN is a 73 year y.o. F with medical history significant for HTN, HLD, afib, asthma, obesity, (HFrEF (EF 35% in 9/2020 with now recovered EF [6/2022]).     She is here for follow-up.  I last saw her on 4/3/23.  She states she is feeling better since increasing lasix dose (alternating lasix 40mg with 20mg every other days).  She states SOB, leg edema and fatigue have improved.   BP is well controlled.  She is compliant with meds  Denies CP, SOB, syncope, edema Patient denies changes in her exercise tolerance during the past 6 months. She can walk 10 blocks and can climb 2-3 flights of stairs.  Sleeps with 2 pillows   She denies history of MI, CVA, DM.  Denies family history of premature ASCVD and /or SCD  MEDS Atorvastatin 40 mg entresto 24-26 mg  Lasix 40 mg Metformin 500 mg Metoprolol xl 100 mg xarelto 20 mg    DATA ECG (2/21/24) AF at 74 bpm w nl axis  ECG (1/25/23): AF at 77 bpm w nl axis, nonspecific TW abn and PVC EKG (5/16/22) : AF at 98 bpm with normal axis and nonspecific TW abnormality   Holter (3/6/23): Min 40 bpm, Avg 81 bpm, Max 148 bpm. AFib (100%). PVCs (3%). No VT/pauses    ECHO (6/27/22): Normal LV size and function. EF 50-55%. Trace MR. Normal PASP  CAROTID Duplex (5/25/22): No hemodynamically significant stenosis. Estimated stenosis 16-49% b/l. Antegrade flow is identified within both vertebral arteries.   LABS (2/21/24) A1C 5.8, TSH 1.40, K 4.9, CRE 0.79, LFT's wnl, eGFR 79, TG 58, , HDL 56, LDL 77, NON-HDL 89, HGB 11.2, HCT 37.0. (10/10/23): Hgb 11.5; HCt 37.8; A1c 6.0; ; TG 58; LDL 70; HDL 59; NonHDL 82; Cre 0.61; K 4.8; LFTs wnl; eGFR 95; TSH 0.85  (3/6/23):: ProBNP 1597 (1/25/23): proBNP 1505; Hgb 12; Hct 39.8; A1c 6.0; TSH 0.79; ; TG 83; LDL 87; HDL 60; NonHDL 104; Cre 0.75; K 4.2; AST 54; ALT 52; eGFR 85 (6/1/22): ; TG 59; LDL 78; HDL 51; nonHDL 90; Cre 0.71; K 4.1; LFTs wnl; eGFR 91.

## 2024-03-07 LAB
BASOPHILS # BLD AUTO: 0.03 K/UL
BASOPHILS NFR BLD AUTO: 0.4 %
EOSINOPHIL # BLD AUTO: 0.09 K/UL
EOSINOPHIL NFR BLD AUTO: 1.2 %
HCT VFR BLD CALC: 37 %
HGB BLD-MCNC: 11.2 G/DL
IMM GRANULOCYTES NFR BLD AUTO: 0.4 %
LYMPHOCYTES # BLD AUTO: 1.85 K/UL
LYMPHOCYTES NFR BLD AUTO: 24.2 %
MAN DIFF?: NORMAL
MCHC RBC-ENTMCNC: 26.2 PG
MCHC RBC-ENTMCNC: 30.3 GM/DL
MCV RBC AUTO: 86.7 FL
MONOCYTES # BLD AUTO: 0.64 K/UL
MONOCYTES NFR BLD AUTO: 8.4 %
NEUTROPHILS # BLD AUTO: 5.01 K/UL
NEUTROPHILS NFR BLD AUTO: 65.4 %
PLATELET # BLD AUTO: 201 K/UL
RBC # BLD: 4.27 M/UL
RBC # FLD: 15.5 %
WBC # FLD AUTO: 7.65 K/UL

## 2024-04-09 ENCOUNTER — APPOINTMENT (OUTPATIENT)
Dept: OBGYN | Facility: CLINIC | Age: 74
End: 2024-04-09
Payer: MEDICARE

## 2024-04-09 VITALS
TEMPERATURE: 97.7 F | BODY MASS INDEX: 32.95 KG/M2 | HEIGHT: 66 IN | SYSTOLIC BLOOD PRESSURE: 110 MMHG | DIASTOLIC BLOOD PRESSURE: 66 MMHG | OXYGEN SATURATION: 99 % | HEART RATE: 81 BPM | WEIGHT: 205 LBS

## 2024-04-09 DIAGNOSIS — N95.2 POSTMENOPAUSAL ATROPHIC VAGINITIS: ICD-10-CM

## 2024-04-09 DIAGNOSIS — Z01.419 ENCOUNTER FOR GYNECOLOGICAL EXAMINATION (GENERAL) (ROUTINE) W/OUT ABNORMAL FINDINGS: ICD-10-CM

## 2024-04-09 PROCEDURE — G0101: CPT

## 2024-04-09 RX ORDER — SACUBITRIL AND VALSARTAN 24; 26 MG/1; MG/1
24-26 TABLET, FILM COATED ORAL
Qty: 180 | Refills: 1 | Status: ACTIVE | COMMUNITY
Start: 2023-02-09

## 2024-04-09 RX ORDER — CHOLECALCIFEROL (VITAMIN D3) 1250 MCG
1.25 MG CAPSULE ORAL
Qty: 12 | Refills: 0 | Status: COMPLETED | COMMUNITY
Start: 2023-01-27 | End: 2024-04-09

## 2024-04-09 RX ORDER — MULTIVIT-MIN/FOLIC/VIT K/LYCOP 400-300MCG
50 MCG TABLET ORAL
Qty: 90 | Refills: 0 | Status: COMPLETED | COMMUNITY
Start: 2023-10-24 | End: 2024-04-09

## 2024-04-09 RX ORDER — ATORVASTATIN CALCIUM 40 MG/1
40 TABLET, FILM COATED ORAL
Qty: 1 | Refills: 1 | Status: COMPLETED | COMMUNITY
Start: 2022-05-16 | End: 2024-04-09

## 2024-04-09 RX ORDER — ERGOCALCIFEROL 1.25 MG/1
1.25 MG CAPSULE, LIQUID FILLED ORAL
Qty: 4 | Refills: 1 | Status: COMPLETED | COMMUNITY
Start: 2023-04-21 | End: 2024-04-09

## 2024-04-09 RX ORDER — METOPROLOL SUCCINATE 100 MG/1
100 TABLET, EXTENDED RELEASE ORAL DAILY
Qty: 90 | Refills: 1 | Status: ACTIVE | COMMUNITY
Start: 2021-03-02

## 2024-04-09 RX ORDER — BUDESONIDE AND FORMOTEROL FUMARATE DIHYDRATE 160; 4.5 UG/1; UG/1
160-4.5 AEROSOL RESPIRATORY (INHALATION)
Qty: 10.2 | Refills: 1 | Status: COMPLETED | COMMUNITY
Start: 2022-08-02 | End: 2024-04-09

## 2024-04-09 RX ORDER — ZOSTER VACCINE RECOMBINANT, ADJUVANTED 50 MCG/0.5
50 KIT INTRAMUSCULAR
Qty: 1 | Refills: 1 | Status: COMPLETED | COMMUNITY
Start: 2024-02-21 | End: 2024-04-09

## 2024-04-09 RX ORDER — ALBUTEROL SULFATE 90 UG/1
108 (90 BASE) INHALANT RESPIRATORY (INHALATION)
Qty: 8.5 | Refills: 3 | Status: COMPLETED | COMMUNITY
Start: 2022-07-23 | End: 2024-04-09

## 2024-04-09 RX ORDER — POLYETHYLENE GLYCOL-3350 AND ELECTROLYTES 236; 6.74; 5.86; 2.97; 22.74 G/274.31G; G/274.31G; G/274.31G; G/274.31G; G/274.31G
236 POWDER, FOR SOLUTION ORAL
Qty: 4000 | Refills: 0 | Status: COMPLETED | COMMUNITY
Start: 2023-03-13 | End: 2024-04-09

## 2024-04-09 RX ORDER — METFORMIN HYDROCHLORIDE 500 MG/1
500 TABLET, COATED ORAL
Qty: 90 | Refills: 0 | Status: ACTIVE | COMMUNITY
Start: 2023-07-10

## 2024-04-09 RX ORDER — METFORMIN HYDROCHLORIDE 500 MG/1
500 TABLET, COATED ORAL
Qty: 90 | Refills: 0 | Status: COMPLETED | COMMUNITY
Start: 2024-01-02 | End: 2024-04-09

## 2024-04-09 RX ORDER — RIVAROXABAN 20 MG/1
20 TABLET, FILM COATED ORAL
Qty: 90 | Refills: 1 | Status: ACTIVE | COMMUNITY
Start: 2022-01-06

## 2024-04-09 RX ORDER — DICLOFENAC SODIUM 1% 10 MG/G
1 GEL TOPICAL
Qty: 1 | Refills: 0 | Status: COMPLETED | COMMUNITY
Start: 2023-05-19 | End: 2024-04-09

## 2024-04-09 NOTE — HISTORY OF PRESENT ILLNESS
[Regular Cycle Intervals] : periods have been regular [Menarche Age: ____] : age at menarche was [unfilled] [FreeTextEntry1] : 56yrs [Previously active] : previously active [Men] : men [Vaginal] : vaginal [No] : No

## 2024-04-09 NOTE — PHYSICAL EXAM
[Chaperone Present] : A chaperone was present in the examining room during all aspects of the physical examination [Appropriately responsive] : appropriately responsive [Alert] : alert [No Acute Distress] : no acute distress [No Lymphadenopathy] : no lymphadenopathy [Regular Rate Rhythm] : regular rate rhythm [No Murmurs] : no murmurs [Clear to Auscultation B/L] : clear to auscultation bilaterally [Soft] : soft [Non-tender] : non-tender [Non-distended] : non-distended [No HSM] : No HSM [No Lesions] : no lesions [No Mass] : no mass [Oriented x3] : oriented x3 [Examination Of The Breasts] : a normal appearance [Normal] : normal [No Masses] : no breast masses were palpable [Atrophy] : atrophy [Discharge] : discharge [Scant] : scant [Foul Smelling] : not foul smelling [Clear] : clear [Absent] : absent

## 2024-04-10 LAB — HPV HIGH+LOW RISK DNA PNL CVX: NOT DETECTED

## 2024-04-14 LAB — CYTOLOGY CVX/VAG DOC THIN PREP: NORMAL

## 2024-04-30 ENCOUNTER — APPOINTMENT (OUTPATIENT)
Dept: PULMONOLOGY | Facility: CLINIC | Age: 74
End: 2024-04-30
Payer: MEDICARE

## 2024-04-30 VITALS
SYSTOLIC BLOOD PRESSURE: 113 MMHG | BODY MASS INDEX: 32.14 KG/M2 | RESPIRATION RATE: 14 BRPM | HEIGHT: 66 IN | OXYGEN SATURATION: 96 % | HEART RATE: 97 BPM | DIASTOLIC BLOOD PRESSURE: 66 MMHG | WEIGHT: 200 LBS | TEMPERATURE: 98.2 F

## 2024-04-30 DIAGNOSIS — Z95.5 ATHEROSCLEROTIC HEART DISEASE OF NATIVE CORONARY ARTERY W/OUT ANGINA PECTORIS: ICD-10-CM

## 2024-04-30 DIAGNOSIS — I48.91 UNSPECIFIED ATRIAL FIBRILLATION: ICD-10-CM

## 2024-04-30 DIAGNOSIS — I25.10 ATHEROSCLEROTIC HEART DISEASE OF NATIVE CORONARY ARTERY W/OUT ANGINA PECTORIS: ICD-10-CM

## 2024-04-30 DIAGNOSIS — I50.20 UNSPECIFIED SYSTOLIC (CONGESTIVE) HEART FAILURE: ICD-10-CM

## 2024-04-30 DIAGNOSIS — R21 RASH AND OTHER NONSPECIFIC SKIN ERUPTION: ICD-10-CM

## 2024-04-30 DIAGNOSIS — I10 ESSENTIAL (PRIMARY) HYPERTENSION: ICD-10-CM

## 2024-04-30 DIAGNOSIS — Z87.891 PERSONAL HISTORY OF NICOTINE DEPENDENCE: ICD-10-CM

## 2024-04-30 DIAGNOSIS — J44.9 CHRONIC OBSTRUCTIVE PULMONARY DISEASE, UNSPECIFIED: ICD-10-CM

## 2024-04-30 DIAGNOSIS — Z83.3 FAMILY HISTORY OF DIABETES MELLITUS: ICD-10-CM

## 2024-04-30 DIAGNOSIS — E78.5 HYPERLIPIDEMIA, UNSPECIFIED: ICD-10-CM

## 2024-04-30 PROCEDURE — 94726 PLETHYSMOGRAPHY LUNG VOLUMES: CPT

## 2024-04-30 PROCEDURE — 94060 EVALUATION OF WHEEZING: CPT

## 2024-04-30 PROCEDURE — 99214 OFFICE O/P EST MOD 30 MIN: CPT | Mod: 25

## 2024-04-30 PROCEDURE — 94729 DIFFUSING CAPACITY: CPT

## 2024-04-30 RX ORDER — ALBUTEROL SULFATE 90 UG/1
108 (90 BASE) INHALANT RESPIRATORY (INHALATION)
Qty: 1 | Refills: 6 | Status: ACTIVE | COMMUNITY
Start: 2024-04-30 | End: 1900-01-01

## 2024-04-30 RX ORDER — BUDESONIDE AND FORMOTEROL FUMARATE DIHYDRATE 80; 4.5 UG/1; UG/1
80-4.5 AEROSOL RESPIRATORY (INHALATION) TWICE DAILY
Qty: 1 | Refills: 3 | Status: ACTIVE | COMMUNITY
Start: 2024-04-30 | End: 1900-01-01

## 2024-04-30 NOTE — HISTORY OF PRESENT ILLNESS
[Former] : former [Never] : never [TextBox_4] : Patient is a 73-year-old female past medical history significant for hypertension high cholesterol atrial fibrillation, asthma obesity heart failure with reduced EF, EF of 35% 9/20/2022 and now her EF has recovered.  The patient is followed by cardiology.  Patient presents today for pulmonary follow-up.  She states that her cough shortness of breath and dyspnea on exertion have improved but not resolved.  She denies fevers chills chest pain weight loss or hemoptysis.

## 2024-04-30 NOTE — ASSESSMENT
[FreeTextEntry1] : In summary the patient is a 73-year-old obese female past medical history significant for heart failure with reduced EF hypertension dyslipidemia atrial fibrillation possible obstructive sleep apnea who presents for follow-up.  Patient's physical exam is significant for improved air entry bilaterally. Patient underwent a pulmonary function test which revealed moderate obstructive airways disease mild restriction.  Patient is started Symbicort 80/4.52 puffs twice daily as well as albuterol as a rescue inhaler.  She is instructed to continue current therapy and follow-up in 3 months.

## 2024-05-01 ENCOUNTER — APPOINTMENT (OUTPATIENT)
Dept: PULMONOLOGY | Facility: CLINIC | Age: 74
End: 2024-05-01

## 2024-06-03 ENCOUNTER — RX RENEWAL (OUTPATIENT)
Age: 74
End: 2024-06-03

## 2024-06-03 RX ORDER — FUROSEMIDE 40 MG/1
40 TABLET ORAL DAILY
Qty: 30 | Refills: 5 | Status: ACTIVE | COMMUNITY
Start: 2023-01-30 | End: 1900-01-01

## 2024-07-02 ENCOUNTER — APPOINTMENT (OUTPATIENT)
Age: 74
End: 2024-07-02

## 2024-07-02 VITALS
WEIGHT: 200 LBS | SYSTOLIC BLOOD PRESSURE: 123 MMHG | HEART RATE: 80 BPM | BODY MASS INDEX: 32.14 KG/M2 | OXYGEN SATURATION: 98 % | RESPIRATION RATE: 14 BRPM | HEIGHT: 66 IN | DIASTOLIC BLOOD PRESSURE: 72 MMHG

## 2024-07-02 DIAGNOSIS — R53.83 OTHER FATIGUE: ICD-10-CM

## 2024-07-02 DIAGNOSIS — J44.9 CHRONIC OBSTRUCTIVE PULMONARY DISEASE, UNSPECIFIED: ICD-10-CM

## 2024-07-02 DIAGNOSIS — Z87.891 PERSONAL HISTORY OF NICOTINE DEPENDENCE: ICD-10-CM

## 2024-07-02 DIAGNOSIS — E66.9 OBESITY, UNSPECIFIED: ICD-10-CM

## 2024-07-02 PROCEDURE — G2211 COMPLEX E/M VISIT ADD ON: CPT

## 2024-07-02 PROCEDURE — 99214 OFFICE O/P EST MOD 30 MIN: CPT

## 2024-07-02 RX ORDER — FLUTICASONE FUROATE, UMECLIDINIUM BROMIDE AND VILANTEROL TRIFENATATE 100; 62.5; 25 UG/1; UG/1; UG/1
100-62.5-25 POWDER RESPIRATORY (INHALATION) DAILY
Qty: 1 | Refills: 3 | Status: ACTIVE | COMMUNITY
Start: 2024-07-02 | End: 1900-01-01

## 2024-07-15 ENCOUNTER — APPOINTMENT (OUTPATIENT)
Dept: HEART AND VASCULAR | Facility: CLINIC | Age: 74
End: 2024-07-15
Payer: MEDICARE

## 2024-07-15 VITALS
OXYGEN SATURATION: 98 % | HEART RATE: 78 BPM | TEMPERATURE: 97 F | RESPIRATION RATE: 16 BRPM | SYSTOLIC BLOOD PRESSURE: 116 MMHG | BODY MASS INDEX: 32.14 KG/M2 | DIASTOLIC BLOOD PRESSURE: 69 MMHG | HEIGHT: 66 IN | WEIGHT: 200 LBS

## 2024-07-15 DIAGNOSIS — E78.5 HYPERLIPIDEMIA, UNSPECIFIED: ICD-10-CM

## 2024-07-15 DIAGNOSIS — R94.31 ABNORMAL ELECTROCARDIOGRAM [ECG] [EKG]: ICD-10-CM

## 2024-07-15 DIAGNOSIS — I10 ESSENTIAL (PRIMARY) HYPERTENSION: ICD-10-CM

## 2024-07-15 DIAGNOSIS — I48.91 UNSPECIFIED ATRIAL FIBRILLATION: ICD-10-CM

## 2024-07-15 DIAGNOSIS — I50.20 UNSPECIFIED SYSTOLIC (CONGESTIVE) HEART FAILURE: ICD-10-CM

## 2024-07-15 DIAGNOSIS — R60.0 LOCALIZED EDEMA: ICD-10-CM

## 2024-07-15 PROCEDURE — 99214 OFFICE O/P EST MOD 30 MIN: CPT

## 2024-07-15 PROCEDURE — G2211 COMPLEX E/M VISIT ADD ON: CPT

## 2024-07-15 RX ORDER — EMPAGLIFLOZIN 10 MG/1
10 TABLET, FILM COATED ORAL DAILY
Qty: 1 | Refills: 5 | Status: ACTIVE | COMMUNITY
Start: 2024-07-15 | End: 1900-01-01

## 2024-07-22 ENCOUNTER — RX RENEWAL (OUTPATIENT)
Age: 74
End: 2024-07-22

## 2024-07-23 ENCOUNTER — APPOINTMENT (OUTPATIENT)
Dept: ORTHOPEDIC SURGERY | Facility: CLINIC | Age: 74
End: 2024-07-23
Payer: MEDICARE

## 2024-07-23 VITALS — BODY MASS INDEX: 32.14 KG/M2 | HEIGHT: 66 IN | WEIGHT: 200 LBS

## 2024-07-23 DIAGNOSIS — M17.0 BILATERAL PRIMARY OSTEOARTHRITIS OF KNEE: ICD-10-CM

## 2024-07-23 PROCEDURE — 20610 DRAIN/INJ JOINT/BURSA W/O US: CPT | Mod: 50

## 2024-07-23 PROCEDURE — 99214 OFFICE O/P EST MOD 30 MIN: CPT | Mod: 25

## 2024-07-23 NOTE — HISTORY OF PRESENT ILLNESS
[de-identified] : Established patient returns today a little bit more than 1 year at her last visit that point she was given bilateral cortisone injections for moderate osteoarthritis of both knees patient states that she had significant improvement on a long-term basis from those injections.  Lately she has noted increasing discomfort going up and down stairs she is here for repeat evaluation.

## 2024-07-23 NOTE — PROCEDURE
[de-identified] : [id number] LOT # [id number] EXP 2025/11 1% 500MG/50ML   KENALOG-10 DocASAP NDC [id number] LOT # [id number] EXP 12/25 50MG/5ML  Patient given bilateral cortisone injections today this done under sterile conditions lateral compartment of each knee.  Patient tolerated injections well.

## 2024-07-23 NOTE — PHYSICAL EXAM
[de-identified] : Left knee on exam there is warmth soft tissue is noted with small effusion mild valgus inclination.  Range of motion 0 to 110 degrees.  Knee is stable to varus valgus stress in both to full extension and 90 degrees of flexion.\par  \par  Similar findings to a lesser degree on the right knee.  There is definite warmth and soft tissue swelling lateral joint line tenderness is noted mild valgus ablation range of motion 0 to 125 degrees the knee is stable to AP stress varus valgus stress in both full extension and 9 degrees of flexion.

## 2024-07-23 NOTE — REASON FOR VISIT
[Follow-Up Visit] : a follow-up visit for [Knee Pain] : knee pain [FreeTextEntry2] : BILATERAL KNEE PAIN. THE LEFT KNEE IS WORSE. SHE IS ALSO SUFFERING FROM VARICOSE VEINS.

## 2024-07-23 NOTE — DISCUSSION/SUMMARY
[de-identified] : Patient I discussed our options I believe since she has such a significant sustained improvement with the previous cortisone injections we will simply reinject the knees again today.  I also advised patient to keep her weight down and try to lose weight and also take Tylenol if needed and ice the knees when symptomatic.  Patient follow-up as needed.  This consultation lasted 30 minutes.

## 2024-08-05 ENCOUNTER — RX RENEWAL (OUTPATIENT)
Age: 74
End: 2024-08-05

## 2024-08-06 ENCOUNTER — APPOINTMENT (OUTPATIENT)
Age: 74
End: 2024-08-06

## 2024-08-12 ENCOUNTER — RX RENEWAL (OUTPATIENT)
Age: 74
End: 2024-08-12

## 2024-08-15 ENCOUNTER — APPOINTMENT (OUTPATIENT)
Age: 74
End: 2024-08-15
Payer: MEDICARE

## 2024-08-15 ENCOUNTER — NON-APPOINTMENT (OUTPATIENT)
Age: 74
End: 2024-08-15

## 2024-08-15 VITALS
HEIGHT: 66 IN | BODY MASS INDEX: 32.14 KG/M2 | SYSTOLIC BLOOD PRESSURE: 107 MMHG | OXYGEN SATURATION: 99 % | RESPIRATION RATE: 15 BRPM | WEIGHT: 200 LBS | DIASTOLIC BLOOD PRESSURE: 65 MMHG | HEART RATE: 77 BPM

## 2024-08-15 DIAGNOSIS — E78.5 HYPERLIPIDEMIA, UNSPECIFIED: ICD-10-CM

## 2024-08-15 DIAGNOSIS — I25.10 ATHEROSCLEROTIC HEART DISEASE OF NATIVE CORONARY ARTERY W/OUT ANGINA PECTORIS: ICD-10-CM

## 2024-08-15 DIAGNOSIS — I48.91 UNSPECIFIED ATRIAL FIBRILLATION: ICD-10-CM

## 2024-08-15 DIAGNOSIS — I50.20 UNSPECIFIED SYSTOLIC (CONGESTIVE) HEART FAILURE: ICD-10-CM

## 2024-08-15 DIAGNOSIS — I10 ESSENTIAL (PRIMARY) HYPERTENSION: ICD-10-CM

## 2024-08-15 PROCEDURE — 99214 OFFICE O/P EST MOD 30 MIN: CPT

## 2024-08-15 PROCEDURE — G2211 COMPLEX E/M VISIT ADD ON: CPT

## 2024-08-15 NOTE — ASSESSMENT
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  Overall, she feels well. Leg edema has resolved.   - continue taking other cardiac meds (entresto, metoprolol, lasix, atorvastatin, xarelto, jardiance).  - CAD and no ischemia eval in >3yrs --> Schedule a stress MPI to evaluate for ischemia and risk stratify - daily weight monitoring  - Increase ambulation as tolerated to aim for approximately 30 minutes of moderate activity 5 days a week.  Heart healthy diet low in sodium, sugars and saturated fats and high in fruits, vegetables and whole grains.  Weight loss.   Patient advised to go to the nearest ED whenever any symptoms persist and/or worsens.  Patient/Family/Caregiver verbalized complete understanding of these instructions.  I spent a total of 25 minutes with more than 50% spent on counseling and coordinating care.   RTO after test results is available.

## 2024-08-15 NOTE — PHYSICAL EXAM
[Well Developed] : well developed [Well Nourished] : well nourished [No Acute Distress] : no acute distress [Normal Venous Pressure] : normal venous pressure [No Carotid Bruit] : no carotid bruit [Normal S1, S2] : normal S1, S2 [No Murmur] : no murmur [No Rub] : no rub [No Gallop] : no gallop [Clear Lung Fields] : clear lung fields [Good Air Entry] : good air entry [No Respiratory Distress] : no respiratory distress  [No Edema] : no edema [No Cyanosis] : no cyanosis [No Clubbing] : no clubbing [No Varicosities] : no varicosities [Moves all extremities] : moves all extremities [No Focal Deficits] : no focal deficits [Normal Speech] : normal speech [Alert and Oriented] : alert and oriented [Normal memory] : normal memory [de-identified] : Irreg irreg

## 2024-08-15 NOTE — HISTORY OF PRESENT ILLNESS
[FreeTextEntry1] : HELEN VALENTIN is a 74 year y.o. F with medical history significant for HTN, HLD, DM2, Afib (on AC and rate control), asthma, obesity, (HFrEF (EF 35% in 9/2020 with now recovered EF [6/2022]), CAD (CCTA 2021).     She is here for follow-up.   She reports feeling well.  Leg edema has resolved.    BP is well controlled.  She is compliant with meds  Denies CP, SOB, syncope, leg edema, orthopnea, LH Patient denies changes in her exercise tolerance during the past 6 months. She can walk 8 blocks and can climb 2 flights of stairs.  Sleeps with 2 pillows   She denies history of MI, CVA Denies family history of premature ASCVD and /or SCD  MEDS Atorvastatin 40 mg entresto 24-26 mg  Lasix 40 mg daily Metformin 500 mg Metoprolol xl 100 mg xarelto 20 mg  jardiance 10 mg   DATA ECG (2/21/24) AF at 74 bpm w nl axis, low voltage, PRWP and nonspecific TW abn  ECG (1/25/23): AF at 77 bpm w nl axis, nonspecific TW abn and PVC EKG (5/16/22) : AF at 98 bpm with normal axis and nonspecific TW abnormality   Holter (3/6/23): Min 40 bpm, Avg 81 bpm, Max 148 bpm. AFib (100%). PVCs (3%). No VT/pauses    ECHO (6/27/22): Normal LV size and function. EF 50-55%. Trace MR. Normal PASP  CAROTID Duplex (5/25/22): No hemodynamically significant stenosis. Estimated stenosis 16-49% b/l. Antegrade flow is identified within both vertebral arteries.   LABS (2/21/24) A1C 5.8, TSH 1.40, K 4.9, CRE 0.79, LFT's wnl, eGFR 79, TG 58, , HDL 56, LDL 77, NON-HDL 89, HGB 11.2, HCT 37.0. (10/10/23): Hgb 11.5; HCt 37.8; A1c 6.0; ; TG 58; LDL 70; HDL 59; NonHDL 82; Cre 0.61; K 4.8; LFTs wnl; eGFR 95; TSH 0.85  (3/6/23): ProBNP 1597 (1/25/23): proBNP 1505; Hgb 12; Hct 39.8; A1c 6.0; TSH 0.79; ; TG 83; LDL 87; HDL 60; NonHDL 104; Cre 0.75; K 4.2; AST 54; ALT 52; eGFR 85 (6/1/22): ; TG 59; LDL 78; HDL 51; nonHDL 90; Cre 0.71; K 4.1; LFTs wnl; eGFR 91.  CCTA (6/21/21): Calcium score 996. (LM: 46; ; LCx 432; RCA 7).  Non-obstructive epicardial cad. Severe coronary artery calcium.  mLAD 30-49%. LCx <30%. RCA <30%.  LV is mildly dilatyed.

## 2024-08-15 NOTE — REVIEW OF SYSTEMS
[Lower Ext Edema] : lower extremity edema [Fever] : no fever [Headache] : no headache [Chills] : no chills [Feeling Fatigued] : not feeling fatigued [SOB] : no shortness of breath [Dyspnea on exertion] : not dyspnea during exertion [Chest Discomfort] : no chest discomfort [Leg Claudication] : no intermittent leg claudication [Palpitations] : no palpitations [Orthopnea] : no orthopnea [PND] : no PND [Syncope] : no syncope [Cough] : no cough [Wheezing] : no wheezing [Coughing Up Blood] : no hemoptysis [Snoring] : no snoring [Dizziness] : no dizziness [Convulsions] : no convulsions [Limb Weakness (Paresis)] : no limb weakness (Paresis) [Confusion] : no confusion was observed [Depression] : no depression [Anxiety] : no anxiety [Under Stress] : not under stress [Suicidal] : not suicidal [Easy Bleeding] : no tendency for easy bleeding [Easy Bruising] : no tendency for easy bruising

## 2024-08-27 ENCOUNTER — APPOINTMENT (OUTPATIENT)
Dept: VASCULAR SURGERY | Facility: CLINIC | Age: 74
End: 2024-08-27

## 2024-09-05 ENCOUNTER — APPOINTMENT (OUTPATIENT)
Dept: PULMONOLOGY | Facility: CLINIC | Age: 74
End: 2024-09-05

## 2024-09-23 ENCOUNTER — RX RENEWAL (OUTPATIENT)
Age: 74
End: 2024-09-23

## 2024-12-09 ENCOUNTER — APPOINTMENT (OUTPATIENT)
Age: 74
End: 2024-12-09
Payer: MEDICARE

## 2024-12-09 VITALS
HEIGHT: 66 IN | RESPIRATION RATE: 14 BRPM | DIASTOLIC BLOOD PRESSURE: 49 MMHG | HEART RATE: 63 BPM | TEMPERATURE: 97.6 F | BODY MASS INDEX: 32.21 KG/M2 | OXYGEN SATURATION: 94 % | SYSTOLIC BLOOD PRESSURE: 99 MMHG | WEIGHT: 200.4 LBS

## 2024-12-09 DIAGNOSIS — R73.03 PREDIABETES.: ICD-10-CM

## 2024-12-09 DIAGNOSIS — I50.20 UNSPECIFIED SYSTOLIC (CONGESTIVE) HEART FAILURE: ICD-10-CM

## 2024-12-09 DIAGNOSIS — M17.0 BILATERAL PRIMARY OSTEOARTHRITIS OF KNEE: ICD-10-CM

## 2024-12-09 DIAGNOSIS — L81.9 DISORDER OF PIGMENTATION, UNSPECIFIED: ICD-10-CM

## 2024-12-09 DIAGNOSIS — I48.91 UNSPECIFIED ATRIAL FIBRILLATION: ICD-10-CM

## 2024-12-09 DIAGNOSIS — N60.29 FIBROADENOSIS OF UNSPECIFIED BREAST: ICD-10-CM

## 2024-12-09 DIAGNOSIS — I10 ESSENTIAL (PRIMARY) HYPERTENSION: ICD-10-CM

## 2024-12-09 DIAGNOSIS — Z23 ENCOUNTER FOR IMMUNIZATION: ICD-10-CM

## 2024-12-09 DIAGNOSIS — Z92.29 PERSONAL HISTORY OF OTHER DRUG THERAPY: ICD-10-CM

## 2024-12-09 PROCEDURE — 90656 IIV3 VACC NO PRSV 0.5 ML IM: CPT

## 2024-12-09 PROCEDURE — G0008: CPT

## 2024-12-09 PROCEDURE — G2211 COMPLEX E/M VISIT ADD ON: CPT

## 2024-12-09 PROCEDURE — 99214 OFFICE O/P EST MOD 30 MIN: CPT

## 2024-12-09 PROCEDURE — 36415 COLL VENOUS BLD VENIPUNCTURE: CPT

## 2024-12-09 RX ORDER — MULTIVIT-MIN/IRON/FOLIC ACID/K 18-600-40
50 MCG CAPSULE ORAL
Qty: 30 | Refills: 3 | Status: ACTIVE | COMMUNITY
Start: 2024-12-09 | End: 1900-01-01

## 2024-12-12 LAB
ALBUMIN SERPL ELPH-MCNC: 3.7 G/DL
ALP BLD-CCNC: 76 U/L
ALT SERPL-CCNC: 15 U/L
ANION GAP SERPL CALC-SCNC: 12 MMOL/L
AST SERPL-CCNC: 17 U/L
BASOPHILS # BLD AUTO: 0.02 K/UL
BASOPHILS NFR BLD AUTO: 0.2 %
BILIRUB SERPL-MCNC: 0.3 MG/DL
BUN SERPL-MCNC: 20 MG/DL
CALCIUM SERPL-MCNC: 9.3 MG/DL
CHLORIDE SERPL-SCNC: 108 MMOL/L
CHOLEST SERPL-MCNC: 144 MG/DL
CO2 SERPL-SCNC: 27 MMOL/L
CREAT SERPL-MCNC: 0.81 MG/DL
EGFR: 77 ML/MIN/1.73M2
EOSINOPHIL # BLD AUTO: 0.06 K/UL
EOSINOPHIL NFR BLD AUTO: 0.7 %
ESTIMATED AVERAGE GLUCOSE: 114 MG/DL
GLUCOSE SERPL-MCNC: 97 MG/DL
HBA1C MFR BLD HPLC: 5.6 %
HCT VFR BLD CALC: 40.1 %
HDLC SERPL-MCNC: 56 MG/DL
HGB BLD-MCNC: 11.5 G/DL
IMM GRANULOCYTES NFR BLD AUTO: 0.4 %
LDLC SERPL CALC-MCNC: 75 MG/DL
LYMPHOCYTES # BLD AUTO: 1.82 K/UL
LYMPHOCYTES NFR BLD AUTO: 22.1 %
MAN DIFF?: NORMAL
MCHC RBC-ENTMCNC: 25.4 PG
MCHC RBC-ENTMCNC: 28.7 G/DL
MCV RBC AUTO: 88.7 FL
MONOCYTES # BLD AUTO: 0.57 K/UL
MONOCYTES NFR BLD AUTO: 6.9 %
NEUTROPHILS # BLD AUTO: 5.73 K/UL
NEUTROPHILS NFR BLD AUTO: 69.7 %
NONHDLC SERPL-MCNC: 88 MG/DL
PLATELET # BLD AUTO: 190 K/UL
POTASSIUM SERPL-SCNC: 4.2 MMOL/L
PROT SERPL-MCNC: 5.9 G/DL
RBC # BLD: 4.52 M/UL
RBC # FLD: 16.6 %
SODIUM SERPL-SCNC: 147 MMOL/L
TRIGL SERPL-MCNC: 66 MG/DL
TSH SERPL-ACNC: 0.84 UIU/ML
VIT B12 SERPL-MCNC: 488 PG/ML
WBC # FLD AUTO: 8.23 K/UL

## 2024-12-18 LAB
APPEARANCE: CLEAR
BACTERIA: NEGATIVE /HPF
BILIRUBIN URINE: NEGATIVE
BLOOD URINE: NEGATIVE
CAST: 0 /LPF
COLOR: YELLOW
EPITHELIAL CELLS: 1 /HPF
GLUCOSE QUALITATIVE U: >=1000 MG/DL
KETONES URINE: NEGATIVE MG/DL
LEUKOCYTE ESTERASE URINE: NEGATIVE
MICROSCOPIC-UA: NORMAL
NITRITE URINE: NEGATIVE
PH URINE: 5.5
PROTEIN URINE: NEGATIVE MG/DL
RED BLOOD CELLS URINE: 3 /HPF
SPECIFIC GRAVITY URINE: 1.02
UROBILINOGEN URINE: 0.2 MG/DL
WHITE BLOOD CELLS URINE: 0 /HPF

## 2024-12-19 ENCOUNTER — APPOINTMENT (OUTPATIENT)
Age: 74
End: 2024-12-19

## 2024-12-19 VITALS
RESPIRATION RATE: 14 BRPM | HEIGHT: 66 IN | OXYGEN SATURATION: 96 % | DIASTOLIC BLOOD PRESSURE: 71 MMHG | WEIGHT: 201 LBS | BODY MASS INDEX: 32.3 KG/M2 | HEART RATE: 80 BPM | SYSTOLIC BLOOD PRESSURE: 113 MMHG

## 2024-12-19 PROCEDURE — 99213 OFFICE O/P EST LOW 20 MIN: CPT

## 2024-12-31 ENCOUNTER — APPOINTMENT (OUTPATIENT)
Age: 74
End: 2024-12-31

## 2025-01-12 ENCOUNTER — NON-APPOINTMENT (OUTPATIENT)
Age: 75
End: 2025-01-12

## 2025-01-14 ENCOUNTER — RX CHANGE (OUTPATIENT)
Age: 75
End: 2025-01-14

## 2025-01-15 RX ORDER — BUDESONIDE AND FORMOTEROL FUMARATE DIHYDRATE 80; 4.5 UG/1; UG/1
80-4.5 AEROSOL RESPIRATORY (INHALATION)
Qty: 30.6 | Refills: 1 | Status: ACTIVE | COMMUNITY
Start: 1900-01-01 | End: 1900-01-01

## 2025-01-21 ENCOUNTER — NON-APPOINTMENT (OUTPATIENT)
Age: 75
End: 2025-01-21

## 2025-02-10 ENCOUNTER — APPOINTMENT (OUTPATIENT)
Dept: DERMATOLOGY | Facility: CLINIC | Age: 75
End: 2025-02-10
Payer: MEDICARE

## 2025-02-10 ENCOUNTER — NON-APPOINTMENT (OUTPATIENT)
Age: 75
End: 2025-02-10

## 2025-02-10 DIAGNOSIS — L81.9 DISORDER OF PIGMENTATION, UNSPECIFIED: ICD-10-CM

## 2025-02-10 PROCEDURE — 99203 OFFICE O/P NEW LOW 30 MIN: CPT

## 2025-03-27 ENCOUNTER — RX RENEWAL (OUTPATIENT)
Age: 75
End: 2025-03-27

## 2025-05-13 NOTE — PHYSICAL EXAM
How Severe Are Your Spot(S)?: mild What Type Of Note Output Would You Prefer (Optional)?: Standard Output What Is The Reason For Today's Visit?: Full Body Skin Examination What Is The Reason For Today's Visit? (Being Monitored For X): concerning skin lesions on an annual basis [No Acute Distress] : no acute distress [Normal Oropharynx] : normal oropharynx [III] : Mallampati Class: III [Normal Appearance] : normal appearance [No Neck Mass] : no neck mass [Normal Rate/Rhythm] : normal rate/rhythm [Normal S1, S2] : normal s1, s2 [No Murmurs] : no murmurs [No Resp Distress] : no resp distress [Clear to Auscultation Bilaterally] : clear to auscultation bilaterally [No Abnormalities] : no abnormalities [Benign] : benign [Normal Gait] : normal gait [No Clubbing] : no clubbing [No Cyanosis] : no cyanosis [No Edema] : no edema [FROM] : FROM [Normal Color/ Pigmentation] : normal color/ pigmentation [No Focal Deficits] : no focal deficits [Oriented x3] : oriented x3 [Normal Affect] : normal affect

## 2025-06-05 ENCOUNTER — APPOINTMENT (OUTPATIENT)
Age: 75
End: 2025-06-05
Payer: MEDICARE

## 2025-06-05 ENCOUNTER — NON-APPOINTMENT (OUTPATIENT)
Age: 75
End: 2025-06-05

## 2025-06-05 VITALS
SYSTOLIC BLOOD PRESSURE: 114 MMHG | DIASTOLIC BLOOD PRESSURE: 78 MMHG | TEMPERATURE: 97 F | HEIGHT: 66 IN | HEART RATE: 78 BPM | BODY MASS INDEX: 32.3 KG/M2 | OXYGEN SATURATION: 98 % | RESPIRATION RATE: 16 BRPM | WEIGHT: 201 LBS

## 2025-06-05 DIAGNOSIS — E78.5 HYPERLIPIDEMIA, UNSPECIFIED: ICD-10-CM

## 2025-06-05 DIAGNOSIS — I25.10 ATHEROSCLEROTIC HEART DISEASE OF NATIVE CORONARY ARTERY W/OUT ANGINA PECTORIS: ICD-10-CM

## 2025-06-05 DIAGNOSIS — I10 ESSENTIAL (PRIMARY) HYPERTENSION: ICD-10-CM

## 2025-06-05 DIAGNOSIS — I48.91 UNSPECIFIED ATRIAL FIBRILLATION: ICD-10-CM

## 2025-06-05 DIAGNOSIS — I50.20 UNSPECIFIED SYSTOLIC (CONGESTIVE) HEART FAILURE: ICD-10-CM

## 2025-06-05 PROCEDURE — 99214 OFFICE O/P EST MOD 30 MIN: CPT | Mod: 25

## 2025-06-05 PROCEDURE — 93000 ELECTROCARDIOGRAM COMPLETE: CPT

## 2025-07-23 ENCOUNTER — APPOINTMENT (OUTPATIENT)
Age: 75
End: 2025-07-23
Payer: MEDICARE

## 2025-07-23 ENCOUNTER — LABORATORY RESULT (OUTPATIENT)
Age: 75
End: 2025-07-23

## 2025-07-23 VITALS
HEIGHT: 66 IN | WEIGHT: 204 LBS | DIASTOLIC BLOOD PRESSURE: 85 MMHG | OXYGEN SATURATION: 97 % | BODY MASS INDEX: 32.78 KG/M2 | TEMPERATURE: 98.5 F | RESPIRATION RATE: 16 BRPM | SYSTOLIC BLOOD PRESSURE: 136 MMHG | HEART RATE: 92 BPM

## 2025-07-23 DIAGNOSIS — Z00.00 ENCOUNTER FOR GENERAL ADULT MEDICAL EXAMINATION W/OUT ABNORMAL FINDINGS: ICD-10-CM

## 2025-07-23 DIAGNOSIS — Z87.891 PERSONAL HISTORY OF NICOTINE DEPENDENCE: ICD-10-CM

## 2025-07-23 PROCEDURE — G0444 DEPRESSION SCREEN ANNUAL: CPT

## 2025-07-23 PROCEDURE — 36415 COLL VENOUS BLD VENIPUNCTURE: CPT

## 2025-07-23 PROCEDURE — G0439: CPT

## 2025-07-25 LAB
25(OH)D3 SERPL-MCNC: 29.1 NG/ML
ALBUMIN SERPL ELPH-MCNC: 4.1 G/DL
ALP BLD-CCNC: 79 U/L
ALT SERPL-CCNC: 21 U/L
ANION GAP SERPL CALC-SCNC: 15 MMOL/L
APPEARANCE: CLEAR
AST SERPL-CCNC: 25 U/L
BACTERIA: NEGATIVE /HPF
BASOPHILS # BLD AUTO: 0.02 K/UL
BASOPHILS NFR BLD AUTO: 0.2 %
BILIRUB SERPL-MCNC: 0.4 MG/DL
BILIRUBIN URINE: NEGATIVE
BLOOD URINE: NEGATIVE
BUN SERPL-MCNC: 20 MG/DL
CALCIUM SERPL-MCNC: 9.9 MG/DL
CAST: 0 /LPF
CHLORIDE SERPL-SCNC: 109 MMOL/L
CHOLEST SERPL-MCNC: 175 MG/DL
CO2 SERPL-SCNC: 25 MMOL/L
COLOR: YELLOW
CREAT SERPL-MCNC: 0.68 MG/DL
EGFRCR SERPLBLD CKD-EPI 2021: 91 ML/MIN/1.73M2
EOSINOPHIL # BLD AUTO: 0.1 K/UL
EOSINOPHIL NFR BLD AUTO: 1 %
EPITHELIAL CELLS: 0 /HPF
ESTIMATED AVERAGE GLUCOSE: 120 MG/DL
GLUCOSE QUALITATIVE U: 500 MG/DL
GLUCOSE SERPL-MCNC: 90 MG/DL
HBA1C MFR BLD HPLC: 5.8 %
HCT VFR BLD CALC: 41.5 %
HCV AB SER QL: NONREACTIVE
HCV S/CO RATIO: 0.12 S/CO
HDLC SERPL-MCNC: 68 MG/DL
HGB BLD-MCNC: 12.9 G/DL
HIV1+2 AB SPEC QL IA.RAPID: NONREACTIVE
IMM GRANULOCYTES NFR BLD AUTO: 0.3 %
KETONES URINE: NEGATIVE MG/DL
LDLC SERPL-MCNC: 95 MG/DL
LEUKOCYTE ESTERASE URINE: NEGATIVE
LYMPHOCYTES # BLD AUTO: 1.71 K/UL
LYMPHOCYTES NFR BLD AUTO: 17.7 %
MAN DIFF?: NORMAL
MCHC RBC-ENTMCNC: 26.7 PG
MCHC RBC-ENTMCNC: 31.1 G/DL
MCV RBC AUTO: 85.9 FL
MICROSCOPIC-UA: NORMAL
MONOCYTES # BLD AUTO: 0.64 K/UL
MONOCYTES NFR BLD AUTO: 6.6 %
NEUTROPHILS # BLD AUTO: 7.17 K/UL
NEUTROPHILS NFR BLD AUTO: 74.2 %
NITRITE URINE: NEGATIVE
NONHDLC SERPL-MCNC: 107 MG/DL
PH URINE: 6
PLATELET # BLD AUTO: 221 K/UL
POTASSIUM SERPL-SCNC: 5.1 MMOL/L
PROT SERPL-MCNC: 6.9 G/DL
PROTEIN URINE: NEGATIVE MG/DL
RBC # BLD: 4.83 M/UL
RBC # FLD: 15.7 %
RED BLOOD CELLS URINE: 0 /HPF
SODIUM SERPL-SCNC: 148 MMOL/L
SPECIFIC GRAVITY URINE: 1.01
TRIGL SERPL-MCNC: 65 MG/DL
TSH SERPL-ACNC: 1.01 UIU/ML
UROBILINOGEN URINE: 0.2 MG/DL
WBC # FLD AUTO: 9.67 K/UL
WHITE BLOOD CELLS URINE: 0 /HPF

## 2025-08-07 ENCOUNTER — APPOINTMENT (OUTPATIENT)
Dept: GASTROENTEROLOGY | Facility: CLINIC | Age: 75
End: 2025-08-07

## 2025-08-13 ENCOUNTER — NON-APPOINTMENT (OUTPATIENT)
Age: 75
End: 2025-08-13

## 2025-09-20 ENCOUNTER — RX RENEWAL (OUTPATIENT)
Age: 75
End: 2025-09-20

## 2025-09-20 RX ORDER — SACUBITRIL AND VALSARTAN 24; 26 MG/1; MG/1
24-26 TABLET, FILM COATED ORAL
Qty: 180 | Refills: 1 | Status: ACTIVE | COMMUNITY
Start: 2025-09-20 | End: 1900-01-01

## 2025-09-27 PROBLEM — R93.89 ABNORMAL CHEST CT: Status: ACTIVE | Noted: 2025-09-27
